# Patient Record
Sex: FEMALE | Race: WHITE | Employment: OTHER | URBAN - METROPOLITAN AREA
[De-identification: names, ages, dates, MRNs, and addresses within clinical notes are randomized per-mention and may not be internally consistent; named-entity substitution may affect disease eponyms.]

---

## 2022-09-21 ENCOUNTER — APPOINTMENT (OUTPATIENT)
Dept: GENERAL RADIOLOGY | Age: 77
DRG: 175 | End: 2022-09-21
Attending: INTERNAL MEDICINE
Payer: MEDICARE

## 2022-09-21 ENCOUNTER — HOSPITAL ENCOUNTER (INPATIENT)
Age: 77
LOS: 6 days | Discharge: HOME | DRG: 175 | End: 2022-09-27
Attending: INTERNAL MEDICINE | Admitting: INTERNAL MEDICINE
Payer: MEDICARE

## 2022-09-21 ENCOUNTER — APPOINTMENT (OUTPATIENT)
Dept: CT IMAGING | Age: 77
DRG: 175 | End: 2022-09-21
Attending: INTERNAL MEDICINE
Payer: MEDICARE

## 2022-09-21 DIAGNOSIS — J90 PLEURAL EFFUSION: ICD-10-CM

## 2022-09-21 DIAGNOSIS — F41.9 ANXIETY: ICD-10-CM

## 2022-09-21 DIAGNOSIS — I26.99 BILATERAL PULMONARY EMBOLISM (HCC): Primary | ICD-10-CM

## 2022-09-21 PROBLEM — J96.00 ACUTE RESPIRATORY FAILURE (HCC): Status: ACTIVE | Noted: 2022-09-21

## 2022-09-21 LAB
A/G RATIO: 1 (ref 1.1–2.2)
A/G RATIO: 1.2 (ref 1.1–2.2)
ALBUMIN SERPL-MCNC: 2.8 G/DL (ref 3.4–5)
ALBUMIN SERPL-MCNC: 3 G/DL (ref 3.4–5)
ALP BLD-CCNC: 140 U/L (ref 40–129)
ALP BLD-CCNC: 144 U/L (ref 40–129)
ALT SERPL-CCNC: 28 U/L (ref 10–40)
ALT SERPL-CCNC: 31 U/L (ref 10–40)
ANION GAP SERPL CALCULATED.3IONS-SCNC: 12 MMOL/L (ref 3–16)
ANION GAP SERPL CALCULATED.3IONS-SCNC: 15 MMOL/L (ref 3–16)
ANISOCYTOSIS: ABNORMAL
ANISOCYTOSIS: ABNORMAL
AST SERPL-CCNC: 62 U/L (ref 15–37)
AST SERPL-CCNC: 66 U/L (ref 15–37)
ATYPICAL LYMPHOCYTE RELATIVE PERCENT: 12 % (ref 0–6)
BASOPHILS ABSOLUTE: 0 K/UL (ref 0–0.2)
BASOPHILS ABSOLUTE: 0 K/UL (ref 0–0.2)
BASOPHILS RELATIVE PERCENT: 0 %
BASOPHILS RELATIVE PERCENT: 0 %
BILIRUB SERPL-MCNC: 0.6 MG/DL (ref 0–1)
BILIRUB SERPL-MCNC: 0.6 MG/DL (ref 0–1)
BUN BLDV-MCNC: 39 MG/DL (ref 7–20)
BUN BLDV-MCNC: 43 MG/DL (ref 7–20)
CALCIUM SERPL-MCNC: 9.4 MG/DL (ref 8.3–10.6)
CALCIUM SERPL-MCNC: 9.6 MG/DL (ref 8.3–10.6)
CHLORIDE BLD-SCNC: 92 MMOL/L (ref 99–110)
CHLORIDE BLD-SCNC: 92 MMOL/L (ref 99–110)
CO2: 27 MMOL/L (ref 21–32)
CO2: 31 MMOL/L (ref 21–32)
CREAT SERPL-MCNC: 0.8 MG/DL (ref 0.6–1.2)
CREAT SERPL-MCNC: 0.9 MG/DL (ref 0.6–1.2)
EKG ATRIAL RATE: 100 BPM
EKG DIAGNOSIS: NORMAL
EKG P AXIS: 38 DEGREES
EKG P-R INTERVAL: 150 MS
EKG Q-T INTERVAL: 366 MS
EKG QRS DURATION: 82 MS
EKG QTC CALCULATION (BAZETT): 472 MS
EKG R AXIS: -2 DEGREES
EKG T AXIS: 28 DEGREES
EKG VENTRICULAR RATE: 100 BPM
EOSINOPHILS ABSOLUTE: 0 K/UL (ref 0–0.6)
EOSINOPHILS ABSOLUTE: 0 K/UL (ref 0–0.6)
EOSINOPHILS RELATIVE PERCENT: 0 %
EOSINOPHILS RELATIVE PERCENT: 0 %
GFR AFRICAN AMERICAN: >60
GFR AFRICAN AMERICAN: >60
GFR NON-AFRICAN AMERICAN: >60
GFR NON-AFRICAN AMERICAN: >60
GLUCOSE BLD-MCNC: 83 MG/DL (ref 70–99)
GLUCOSE BLD-MCNC: 96 MG/DL (ref 70–99)
HCT VFR BLD CALC: 33.6 % (ref 36–48)
HCT VFR BLD CALC: 33.9 % (ref 36–48)
HEMATOLOGY PATH CONSULT: NORMAL
HEMATOLOGY PATH CONSULT: YES
HEMOGLOBIN: 11 G/DL (ref 12–16)
HEMOGLOBIN: 11.2 G/DL (ref 12–16)
LIPASE: 142 U/L (ref 13–60)
LIPASE: 175 U/L (ref 13–60)
LYMPHOCYTES ABSOLUTE: 1.1 K/UL (ref 1–5.1)
LYMPHOCYTES ABSOLUTE: 1.6 K/UL (ref 1–5.1)
LYMPHOCYTES RELATIVE PERCENT: 10 %
LYMPHOCYTES RELATIVE PERCENT: 17 %
MAGNESIUM: 1.7 MG/DL (ref 1.8–2.4)
MAGNESIUM: 2.5 MG/DL (ref 1.8–2.4)
MCH RBC QN AUTO: 28.9 PG (ref 26–34)
MCH RBC QN AUTO: 29.5 PG (ref 26–34)
MCHC RBC AUTO-ENTMCNC: 32.8 G/DL (ref 31–36)
MCHC RBC AUTO-ENTMCNC: 33.1 G/DL (ref 31–36)
MCV RBC AUTO: 88.3 FL (ref 80–100)
MCV RBC AUTO: 89.2 FL (ref 80–100)
MONOCYTES ABSOLUTE: 0.5 K/UL (ref 0–1.3)
MONOCYTES ABSOLUTE: 0.5 K/UL (ref 0–1.3)
MONOCYTES RELATIVE PERCENT: 7 %
MONOCYTES RELATIVE PERCENT: 8 %
NEUTROPHILS ABSOLUTE: 4.9 K/UL (ref 1.7–7.7)
NEUTROPHILS ABSOLUTE: 5 K/UL (ref 1.7–7.7)
NEUTROPHILS RELATIVE PERCENT: 71 %
NEUTROPHILS RELATIVE PERCENT: 75 %
PDW BLD-RTO: 15.6 % (ref 12.4–15.4)
PDW BLD-RTO: 15.9 % (ref 12.4–15.4)
PLATELET # BLD: 233 K/UL (ref 135–450)
PLATELET # BLD: 238 K/UL (ref 135–450)
PLATELET SLIDE REVIEW: ADEQUATE
PLATELET SLIDE REVIEW: ADEQUATE
PMV BLD AUTO: 8.6 FL (ref 5–10.5)
PMV BLD AUTO: 8.8 FL (ref 5–10.5)
POLYCHROMASIA: ABNORMAL
POLYCHROMASIA: ABNORMAL
POTASSIUM REFLEX MAGNESIUM: 3.3 MMOL/L (ref 3.5–5.1)
POTASSIUM REFLEX MAGNESIUM: 3.3 MMOL/L (ref 3.5–5.1)
RBC # BLD: 3.8 M/UL (ref 4–5.2)
RBC # BLD: 3.81 M/UL (ref 4–5.2)
SLIDE REVIEW: ABNORMAL
SLIDE REVIEW: ABNORMAL
SODIUM BLD-SCNC: 134 MMOL/L (ref 136–145)
SODIUM BLD-SCNC: 135 MMOL/L (ref 136–145)
TOTAL PROTEIN: 5.5 G/DL (ref 6.4–8.2)
TOTAL PROTEIN: 5.6 G/DL (ref 6.4–8.2)
TROPONIN: 0.05 NG/ML
WBC # BLD: 6.5 K/UL (ref 4–11)
WBC # BLD: 7.1 K/UL (ref 4–11)

## 2022-09-21 PROCEDURE — 86301 IMMUNOASSAY TUMOR CA 19-9: CPT

## 2022-09-21 PROCEDURE — 2700000000 HC OXYGEN THERAPY PER DAY

## 2022-09-21 PROCEDURE — 93010 ELECTROCARDIOGRAM REPORT: CPT | Performed by: INTERNAL MEDICINE

## 2022-09-21 PROCEDURE — 84155 ASSAY OF PROTEIN SERUM: CPT

## 2022-09-21 PROCEDURE — 86304 IMMUNOASSAY TUMOR CA 125: CPT

## 2022-09-21 PROCEDURE — 84484 ASSAY OF TROPONIN QUANT: CPT

## 2022-09-21 PROCEDURE — 83690 ASSAY OF LIPASE: CPT

## 2022-09-21 PROCEDURE — 71260 CT THORAX DX C+: CPT | Performed by: INTERNAL MEDICINE

## 2022-09-21 PROCEDURE — 6360000002 HC RX W HCPCS: Performed by: INTERNAL MEDICINE

## 2022-09-21 PROCEDURE — 80053 COMPREHEN METABOLIC PANEL: CPT

## 2022-09-21 PROCEDURE — 83735 ASSAY OF MAGNESIUM: CPT

## 2022-09-21 PROCEDURE — 82378 CARCINOEMBRYONIC ANTIGEN: CPT

## 2022-09-21 PROCEDURE — 6360000004 HC RX CONTRAST MEDICATION: Performed by: INTERNAL MEDICINE

## 2022-09-21 PROCEDURE — 6370000000 HC RX 637 (ALT 250 FOR IP): Performed by: INTERNAL MEDICINE

## 2022-09-21 PROCEDURE — 99223 1ST HOSP IP/OBS HIGH 75: CPT | Performed by: INTERNAL MEDICINE

## 2022-09-21 PROCEDURE — 93005 ELECTROCARDIOGRAM TRACING: CPT | Performed by: INTERNAL MEDICINE

## 2022-09-21 PROCEDURE — 2060000000 HC ICU INTERMEDIATE R&B

## 2022-09-21 PROCEDURE — 83883 ASSAY NEPHELOMETRY NOT SPEC: CPT

## 2022-09-21 PROCEDURE — 71045 X-RAY EXAM CHEST 1 VIEW: CPT

## 2022-09-21 PROCEDURE — 85025 COMPLETE CBC W/AUTO DIFF WBC: CPT

## 2022-09-21 PROCEDURE — 2580000003 HC RX 258: Performed by: INTERNAL MEDICINE

## 2022-09-21 PROCEDURE — 86300 IMMUNOASSAY TUMOR CA 15-3: CPT

## 2022-09-21 PROCEDURE — 84165 PROTEIN E-PHORESIS SERUM: CPT

## 2022-09-21 PROCEDURE — 94761 N-INVAS EAR/PLS OXIMETRY MLT: CPT

## 2022-09-21 PROCEDURE — 82784 ASSAY IGA/IGD/IGG/IGM EACH: CPT

## 2022-09-21 PROCEDURE — 36415 COLL VENOUS BLD VENIPUNCTURE: CPT

## 2022-09-21 PROCEDURE — 6370000000 HC RX 637 (ALT 250 FOR IP)

## 2022-09-21 RX ORDER — SODIUM CHLORIDE 0.9 % (FLUSH) 0.9 %
5-40 SYRINGE (ML) INJECTION EVERY 12 HOURS SCHEDULED
Status: DISCONTINUED | OUTPATIENT
Start: 2022-09-21 | End: 2022-09-27 | Stop reason: HOSPADM

## 2022-09-21 RX ORDER — ATORVASTATIN CALCIUM 10 MG/1
10 TABLET, FILM COATED ORAL NIGHTLY
Status: DISCONTINUED | OUTPATIENT
Start: 2022-09-21 | End: 2022-09-27 | Stop reason: HOSPADM

## 2022-09-21 RX ORDER — FUROSEMIDE 40 MG/1
40 TABLET ORAL DAILY
Status: ON HOLD | COMMUNITY
End: 2022-09-27 | Stop reason: HOSPADM

## 2022-09-21 RX ORDER — NYSTATIN 100000 [USP'U]/G
100000 POWDER TOPICAL 3 TIMES DAILY
Status: ON HOLD | COMMUNITY
End: 2022-09-21

## 2022-09-21 RX ORDER — FENOFIBRATE 160 MG/1
160 TABLET ORAL DAILY
Status: DISCONTINUED | OUTPATIENT
Start: 2022-09-21 | End: 2022-09-27 | Stop reason: HOSPADM

## 2022-09-21 RX ORDER — FERROUS SULFATE 325(65) MG
325 TABLET ORAL
COMMUNITY

## 2022-09-21 RX ORDER — DIMETHICONE, OXYBENZONE, AND PADIMATE O 2; 2.5; 6.6 G/100G; G/100G; G/100G
STICK TOPICAL
Status: COMPLETED
Start: 2022-09-21 | End: 2022-09-21

## 2022-09-21 RX ORDER — ERGOCALCIFEROL 1.25 MG/1
50000 CAPSULE ORAL WEEKLY
COMMUNITY

## 2022-09-21 RX ORDER — CETIRIZINE HYDROCHLORIDE 10 MG/1
5 TABLET ORAL DAILY
Status: DISCONTINUED | OUTPATIENT
Start: 2022-09-21 | End: 2022-09-27 | Stop reason: HOSPADM

## 2022-09-21 RX ORDER — POLYETHYLENE GLYCOL 3350 17 G/17G
17 POWDER, FOR SOLUTION ORAL DAILY PRN
Status: DISCONTINUED | OUTPATIENT
Start: 2022-09-21 | End: 2022-09-27 | Stop reason: HOSPADM

## 2022-09-21 RX ORDER — ATORVASTATIN CALCIUM 10 MG/1
10 TABLET, FILM COATED ORAL NIGHTLY
COMMUNITY

## 2022-09-21 RX ORDER — SODIUM CHLORIDE 0.9 % (FLUSH) 0.9 %
5-40 SYRINGE (ML) INJECTION PRN
Status: DISCONTINUED | OUTPATIENT
Start: 2022-09-21 | End: 2022-09-27 | Stop reason: HOSPADM

## 2022-09-21 RX ORDER — MYCOPHENOLATE MOFETIL 500 MG/1
1000 TABLET ORAL NIGHTLY
COMMUNITY

## 2022-09-21 RX ORDER — FERROUS SULFATE 325(65) MG
325 TABLET ORAL
Status: DISCONTINUED | OUTPATIENT
Start: 2022-09-21 | End: 2022-09-27 | Stop reason: HOSPADM

## 2022-09-21 RX ORDER — ACETAMINOPHEN 325 MG/1
650 TABLET ORAL EVERY 6 HOURS PRN
COMMUNITY

## 2022-09-21 RX ORDER — MYCOPHENOLATE MOFETIL 500 MG/1
500 TABLET ORAL EVERY MORNING
COMMUNITY

## 2022-09-21 RX ORDER — LANOLIN ALCOHOL/MO/W.PET/CERES
3 CREAM (GRAM) TOPICAL NIGHTLY PRN
COMMUNITY

## 2022-09-21 RX ORDER — ALUMINA, MAGNESIA, AND SIMETHICONE 2400; 2400; 240 MG/30ML; MG/30ML; MG/30ML
30 SUSPENSION ORAL EVERY 4 HOURS PRN
COMMUNITY

## 2022-09-21 RX ORDER — ACETAMINOPHEN 650 MG/1
650 SUPPOSITORY RECTAL EVERY 6 HOURS PRN
Status: DISCONTINUED | OUTPATIENT
Start: 2022-09-21 | End: 2022-09-27 | Stop reason: HOSPADM

## 2022-09-21 RX ORDER — POTASSIUM CHLORIDE 750 MG/1
40 TABLET, EXTENDED RELEASE ORAL PRN
Status: DISCONTINUED | OUTPATIENT
Start: 2022-09-21 | End: 2022-09-27 | Stop reason: HOSPADM

## 2022-09-21 RX ORDER — MAGNESIUM SULFATE IN WATER 40 MG/ML
2000 INJECTION, SOLUTION INTRAVENOUS PRN
Status: DISCONTINUED | OUTPATIENT
Start: 2022-09-21 | End: 2022-09-27 | Stop reason: HOSPADM

## 2022-09-21 RX ORDER — SODIUM CHLORIDE 9 MG/ML
INJECTION, SOLUTION INTRAVENOUS PRN
Status: DISCONTINUED | OUTPATIENT
Start: 2022-09-21 | End: 2022-09-27 | Stop reason: HOSPADM

## 2022-09-21 RX ORDER — TRAMADOL HYDROCHLORIDE 50 MG/1
50 TABLET ORAL EVERY 8 HOURS PRN
Status: ON HOLD | COMMUNITY
End: 2022-09-27 | Stop reason: HOSPADM

## 2022-09-21 RX ORDER — TRAMADOL HYDROCHLORIDE 50 MG/1
50 TABLET ORAL EVERY 6 HOURS PRN
Status: DISCONTINUED | OUTPATIENT
Start: 2022-09-21 | End: 2022-09-27

## 2022-09-21 RX ORDER — MYCOPHENOLATE MOFETIL 250 MG/1
1000 CAPSULE ORAL NIGHTLY
Status: DISCONTINUED | OUTPATIENT
Start: 2022-09-21 | End: 2022-09-27 | Stop reason: HOSPADM

## 2022-09-21 RX ORDER — DOCUSATE SODIUM 100 MG/1
100 CAPSULE, LIQUID FILLED ORAL 2 TIMES DAILY PRN
Status: DISCONTINUED | OUTPATIENT
Start: 2022-09-21 | End: 2022-09-27 | Stop reason: HOSPADM

## 2022-09-21 RX ORDER — POTASSIUM CHLORIDE 7.45 MG/ML
10 INJECTION INTRAVENOUS PRN
Status: DISCONTINUED | OUTPATIENT
Start: 2022-09-21 | End: 2022-09-27 | Stop reason: HOSPADM

## 2022-09-21 RX ORDER — CARVEDILOL 6.25 MG/1
6.25 TABLET ORAL 2 TIMES DAILY WITH MEALS
COMMUNITY

## 2022-09-21 RX ORDER — LEVOCETIRIZINE DIHYDROCHLORIDE 5 MG/1
5 TABLET, FILM COATED ORAL EVERY MORNING
COMMUNITY

## 2022-09-21 RX ORDER — POTASSIUM CHLORIDE 750 MG/1
10 CAPSULE, EXTENDED RELEASE ORAL DAILY
COMMUNITY

## 2022-09-21 RX ORDER — ONDANSETRON 4 MG/1
4 TABLET, ORALLY DISINTEGRATING ORAL EVERY 8 HOURS PRN
Status: DISCONTINUED | OUTPATIENT
Start: 2022-09-21 | End: 2022-09-27 | Stop reason: HOSPADM

## 2022-09-21 RX ORDER — MAGNESIUM SULFATE IN WATER 40 MG/ML
2000 INJECTION, SOLUTION INTRAVENOUS ONCE
Status: COMPLETED | OUTPATIENT
Start: 2022-09-21 | End: 2022-09-21

## 2022-09-21 RX ORDER — FUROSEMIDE 40 MG/1
40 TABLET ORAL DAILY
Status: DISCONTINUED | OUTPATIENT
Start: 2022-09-21 | End: 2022-09-22

## 2022-09-21 RX ORDER — IPRATROPIUM BROMIDE AND ALBUTEROL SULFATE 2.5; .5 MG/3ML; MG/3ML
1 SOLUTION RESPIRATORY (INHALATION) EVERY 4 HOURS PRN
Status: DISCONTINUED | OUTPATIENT
Start: 2022-09-21 | End: 2022-09-27 | Stop reason: HOSPADM

## 2022-09-21 RX ORDER — ENOXAPARIN SODIUM 100 MG/ML
1 INJECTION SUBCUTANEOUS DAILY
Status: DISCONTINUED | OUTPATIENT
Start: 2022-09-21 | End: 2022-09-21

## 2022-09-21 RX ORDER — FENOFIBRATE 160 MG/1
160 TABLET ORAL NIGHTLY
COMMUNITY

## 2022-09-21 RX ORDER — ALPRAZOLAM 0.25 MG/1
0.25 TABLET ORAL 4 TIMES DAILY PRN
Status: DISCONTINUED | OUTPATIENT
Start: 2022-09-21 | End: 2022-09-27 | Stop reason: HOSPADM

## 2022-09-21 RX ORDER — ONDANSETRON 2 MG/ML
4 INJECTION INTRAMUSCULAR; INTRAVENOUS EVERY 6 HOURS PRN
Status: DISCONTINUED | OUTPATIENT
Start: 2022-09-21 | End: 2022-09-27 | Stop reason: HOSPADM

## 2022-09-21 RX ORDER — OMEPRAZOLE 20 MG/1
20 CAPSULE, DELAYED RELEASE ORAL DAILY
COMMUNITY

## 2022-09-21 RX ORDER — CARVEDILOL 6.25 MG/1
6.25 TABLET ORAL 2 TIMES DAILY WITH MEALS
Status: DISCONTINUED | OUTPATIENT
Start: 2022-09-21 | End: 2022-09-27 | Stop reason: HOSPADM

## 2022-09-21 RX ORDER — METOLAZONE 2.5 MG/1
2.5 TABLET ORAL DAILY
Status: ON HOLD | COMMUNITY
End: 2022-09-27 | Stop reason: HOSPADM

## 2022-09-21 RX ORDER — MYCOPHENOLATE MOFETIL 250 MG/1
500 CAPSULE ORAL EVERY MORNING
Status: DISCONTINUED | OUTPATIENT
Start: 2022-09-21 | End: 2022-09-27 | Stop reason: HOSPADM

## 2022-09-21 RX ORDER — ENOXAPARIN SODIUM 100 MG/ML
1 INJECTION SUBCUTANEOUS 2 TIMES DAILY
Status: DISCONTINUED | OUTPATIENT
Start: 2022-09-21 | End: 2022-09-27

## 2022-09-21 RX ORDER — ACETAMINOPHEN 325 MG/1
650 TABLET ORAL EVERY 6 HOURS PRN
Status: DISCONTINUED | OUTPATIENT
Start: 2022-09-21 | End: 2022-09-27 | Stop reason: HOSPADM

## 2022-09-21 RX ORDER — LANOLIN ALCOHOL/MO/W.PET/CERES
3 CREAM (GRAM) TOPICAL NIGHTLY PRN
Status: DISCONTINUED | OUTPATIENT
Start: 2022-09-21 | End: 2022-09-27 | Stop reason: HOSPADM

## 2022-09-21 RX ORDER — DOCUSATE SODIUM 100 MG/1
100 CAPSULE, LIQUID FILLED ORAL 2 TIMES DAILY PRN
COMMUNITY

## 2022-09-21 RX ADMIN — ONDANSETRON HYDROCHLORIDE 4 MG: 2 INJECTION, SOLUTION INTRAMUSCULAR; INTRAVENOUS at 12:40

## 2022-09-21 RX ADMIN — SODIUM CHLORIDE, PRESERVATIVE FREE 10 ML: 5 INJECTION INTRAVENOUS at 22:24

## 2022-09-21 RX ADMIN — ATORVASTATIN CALCIUM 10 MG: 10 TABLET, FILM COATED ORAL at 20:55

## 2022-09-21 RX ADMIN — ACETAMINOPHEN 650 MG: 325 TABLET ORAL at 15:14

## 2022-09-21 RX ADMIN — POTASSIUM BICARBONATE 10 MEQ: 391 TABLET, EFFERVESCENT ORAL at 14:04

## 2022-09-21 RX ADMIN — POTASSIUM CHLORIDE 40 MEQ: 750 TABLET, EXTENDED RELEASE ORAL at 03:58

## 2022-09-21 RX ADMIN — ENOXAPARIN SODIUM 70 MG: 100 INJECTION SUBCUTANEOUS at 10:25

## 2022-09-21 RX ADMIN — CARVEDILOL 6.25 MG: 6.25 TABLET, FILM COATED ORAL at 10:02

## 2022-09-21 RX ADMIN — IOPAMIDOL 85 ML: 755 INJECTION, SOLUTION INTRAVENOUS at 09:22

## 2022-09-21 RX ADMIN — POTASSIUM CHLORIDE 40 MEQ: 750 TABLET, EXTENDED RELEASE ORAL at 12:31

## 2022-09-21 RX ADMIN — SODIUM CHLORIDE, PRESERVATIVE FREE 10 ML: 5 INJECTION INTRAVENOUS at 10:09

## 2022-09-21 RX ADMIN — MAGNESIUM SULFATE HEPTAHYDRATE 2000 MG: 40 INJECTION, SOLUTION INTRAVENOUS at 03:57

## 2022-09-21 RX ADMIN — MYCOPHENOLATE MOFETIL 1000 MG: 250 CAPSULE ORAL at 20:55

## 2022-09-21 RX ADMIN — ACETAMINOPHEN 650 MG: 325 TABLET ORAL at 09:03

## 2022-09-21 RX ADMIN — FENOFIBRATE 160 MG: 160 TABLET ORAL at 10:03

## 2022-09-21 RX ADMIN — FUROSEMIDE 40 MG: 40 TABLET ORAL at 10:02

## 2022-09-21 RX ADMIN — TRAMADOL HYDROCHLORIDE 50 MG: 50 TABLET, COATED ORAL at 04:05

## 2022-09-21 RX ADMIN — ENOXAPARIN SODIUM 70 MG: 100 INJECTION SUBCUTANEOUS at 20:55

## 2022-09-21 RX ADMIN — ALPRAZOLAM 0.25 MG: 0.25 TABLET ORAL at 09:03

## 2022-09-21 RX ADMIN — MYCOPHENOLATE MOFETIL 500 MG: 250 CAPSULE ORAL at 10:03

## 2022-09-21 RX ADMIN — Medication: at 15:06

## 2022-09-21 RX ADMIN — ALPRAZOLAM 0.25 MG: 0.25 TABLET ORAL at 15:14

## 2022-09-21 RX ADMIN — FERROUS SULFATE TAB 325 MG (65 MG ELEMENTAL FE) 325 MG: 325 (65 FE) TAB at 10:02

## 2022-09-21 RX ADMIN — TRAMADOL HYDROCHLORIDE 50 MG: 50 TABLET, COATED ORAL at 09:03

## 2022-09-21 ASSESSMENT — PAIN DESCRIPTION - LOCATION
LOCATION: BACK;RIB CAGE
LOCATION: CHEST
LOCATION: BACK;RIB CAGE
LOCATION: ABDOMEN

## 2022-09-21 ASSESSMENT — PAIN SCALES - GENERAL
PAINLEVEL_OUTOF10: 3
PAINLEVEL_OUTOF10: 8
PAINLEVEL_OUTOF10: 0
PAINLEVEL_OUTOF10: 3
PAINLEVEL_OUTOF10: 8
PAINLEVEL_OUTOF10: 8

## 2022-09-21 ASSESSMENT — PAIN DESCRIPTION - DESCRIPTORS
DESCRIPTORS: THROBBING
DESCRIPTORS: ACHING
DESCRIPTORS: THROBBING

## 2022-09-21 ASSESSMENT — PAIN - FUNCTIONAL ASSESSMENT
PAIN_FUNCTIONAL_ASSESSMENT: PREVENTS OR INTERFERES WITH MANY ACTIVE NOT PASSIVE ACTIVITIES
PAIN_FUNCTIONAL_ASSESSMENT: ACTIVITIES ARE NOT PREVENTED

## 2022-09-21 ASSESSMENT — PAIN DESCRIPTION - FREQUENCY
FREQUENCY: INTERMITTENT
FREQUENCY: INTERMITTENT

## 2022-09-21 ASSESSMENT — PAIN DESCRIPTION - ORIENTATION: ORIENTATION: RIGHT

## 2022-09-21 ASSESSMENT — PAIN DESCRIPTION - PAIN TYPE
TYPE: ACUTE PAIN;CHRONIC PAIN
TYPE: ACUTE PAIN;CHRONIC PAIN

## 2022-09-21 NOTE — PROGRESS NOTES
Care rounds completed with Dr. Haylie Merchant and multidisciplinary team. Reviewed labs, meds, VS, assessment, & plan of care for today. See dictated note and new orders for details.      DC VQ scan  CTA of the chest  Try to wean off oxygen  Downgrade to PCU

## 2022-09-21 NOTE — PROGRESS NOTES
4 Eyes Skin Assessment     The patient is being assess for   Shift Handoff    I agree that 2 RN's have performed a thorough Head to Toe Skin Assessment on the patient. ALL assessment sites listed below have been assessed. Areas assessed for pressure by both nurses:   [x]   Head, Face, and Ears   [x]   Shoulders, Back, and Chest, Abdomen  [x]   Arms, Elbows, and Hands   [x]   Coccyx, Sacrum, and Ischium  [x]   Legs, Feet, and Heels        Skin Assessed Under all Medical Devices by both nurses:  O2 device tubing              All Mepilex Borders were peeled back and area peeked at by both nurses: yes  Please list where Mepilex Borders are located: heels             **SHARE this note so that the co-signing nurse is able to place an eSignature**    Co-signer eSignature: Electronically signed by Blanca Prakash RN on 9/21/22 at 6:46 PM EDT    Does the Patient have Skin Breakdown related to pressure?   Yes LDA WOUND CARE was Initiated documentation include the Melonie-wound, Wound Assessment, Measurements, Dressing Treatment, Drainage, and Color\",              Robin Prevention initiated:  Yes   Wound Care Orders initiated:  Yes      [id number] nurse consulted for Pressure Injury (Stage 3,4, Unstageable, DTI, NWPT, Complex wounds)and New or Established Ostomies:  NA      Primary Nurse eSignature: Electronically signed by Tawana Holter, RN on 9/21/22 at 5:49 PM EDT

## 2022-09-21 NOTE — PROGRESS NOTES
Dr. Jaye Canales called, stated pt with bilateral PEs. Okay to use current anticoagulation of lovenox BID. Call radiology partners and make them aware Dr. Jaye Canales looked at [id number].

## 2022-09-21 NOTE — CARE COORDINATION
Case Management Assessment  Initial Evaluation      Patient Name: Annalisa Pedraza  YOB: 1945  Diagnosis: Acute respiratory failure ([facility] [address] [J96.00]  Acute on chronic respiratory failure ([facility] [address] [J96.20]  Date / Time: 9/21/2022 12:11 AM    Admission status/Date:9/21/2022  Chart Reviewed: Yes      Patient Chantel Dorado: Yes at bedside  Family Interviewed:  Yes Laureen Chaney (daughter) and Abhi Hill (son-in-law) Ryan Valera  with PT's permission    Hospitalization in the last 30 days:  No      Health Care Decision Maker :   Primary Decision Maker: Wesley Hou - Child - [phone]    (CM - must 1st enter selection under Navigator - emergency contact- Reddy 8 Relationship and pick relationship)   Who do you trust or have selected to make healthcare decisions for you      Met with: PT, daughter Livier Bryan, and son in law Abhi Hill at bedside with Pt's permission    Current PCP: Josef Bailey (unsure of spelling)      Financial  Medicare  Precert required for SNF : N         3 night stay required - Y    ADLS  Support Systems/Care Needs: Children  Transportation: family    Meal Preparation: Pt does not cook. Eats frozen meals/pre-packaged food at home. Housing  Living Arrangements: PT currently at [facility], previously living at home with daughter, Efrem Santillan  Steps: 0  Intent for return to present living arrangements: Yes  Identified Issues: Pt requires assistance with most tasks from Efrem Santillan, daughter.      Home Care Information  Active with Home Health Care : No Agency:(Services)     Passport/Waiver : No  :                      Phone Number:    Passport/Waiver Services: n/a          Durable Medical Equiptment   DME Provider: n/a  Equipment: DME at home  Walker__x_Cane___RTS___ BSC_x__Shower Chair__x_Hospital Bed___W/C____Other________  02 at ____Liter(s)---wears(frequency)_______ [facility] ___ CPAP___ BiPap___   N/A____    Home O2 Use :  No    If No for home O2---if presently on O2 during hospitalization:  Yes  if yes CM to follow for potential DC O2 need    Community Service Affiliation  Dialysis:  No      Other Community Services: n/a    DISCHARGE PLAN: Explained Case Management role/services. CM met with pt at bedside with Donald Renner, daughter and Eder Miller, son in law with pt's permission. Pt to return to [facility] upon discharge, transportation first available. Louis Ndiaye at [facility] faxed over the pt's living will, is searching for the POA documents. Wichita Senthil noted that PT was transitioning from Med A to private pay due to Pt's lack of participation in PT/OT during her time at the facility. Family was hoping participation might increase after extending stay. No safety concerns expressed during conversation with CM. Nurse made aware if has safety concerns, can call APS.       Ericka Emmanuel, MSW Student

## 2022-09-21 NOTE — PROGRESS NOTES
RT Inhaler-Nebulizer Bronchodilator Protocol Note    There is a bronchodilator order in the chart from a provider indicating to follow the RT Bronchodilator Protocol and there is an Initiate RT Inhaler-Nebulizer Bronchodilator Protocol order as well (see protocol at bottom of note). CXR Findings:  No results found. The findings from the last RT Protocol Assessment were as follows:   History Pulmonary Disease: Smoker 15 pack years or more  Respiratory Pattern: Regular pattern and RR 12-20 bpm  Breath Sounds: Slightly diminished and/or crackles  Cough: Strong, spontaneous, non-productive  Indication for Bronchodilator Therapy: Decreased or absent breath sounds  Bronchodilator Assessment Score: 3    Aerosolized bronchodilator medication orders have been revised according to the RT Inhaler-Nebulizer Bronchodilator Protocol below. Respiratory Therapist to perform RT Therapy Protocol Assessment initially then follow the protocol. Repeat RT Therapy Protocol Assessment PRN for score 0-3 or on second treatment, BID, and PRN for scores above 3. No Indications - adjust the frequency to every 6 hours PRN wheezing or bronchospasm, if no treatments needed after 48 hours then discontinue using Per Protocol order mode. If indication present, adjust the RT bronchodilator orders based on the Bronchodilator Assessment Score as indicated below. Use Inhaler orders unless patient has one or more of the following: on home nebulizer, not able to hold breath for 10 seconds, is not alert and oriented, cannot activate and use MDI correctly, or respiratory rate 25 breaths per minute or more, then use the equivalent nebulizer order(s) with same Frequency and PRN reasons based on the score. If a patient is on this medication at home then do not decrease Frequency below that used at home.     0-3 - enter or revise RT bronchodilator order(s) to equivalent RT Bronchodilator order with Frequency of every 4 hours PRN for wheezing or increased work of breathing using Per Protocol order mode. 4-6 - enter or revise RT Bronchodilator order(s) to two equivalent RT bronchodilator orders with one order with BID Frequency and one order with Frequency of every 4 hours PRN wheezing or increased work of breathing using Per Protocol order mode. 7-10 - enter or revise RT Bronchodilator order(s) to two equivalent RT bronchodilator orders with one order with TID Frequency and one order with Frequency of every 4 hours PRN wheezing or increased work of breathing using Per Protocol order mode. 11-13 - enter or revise RT Bronchodilator order(s) to one equivalent RT bronchodilator order with QID Frequency and an Albuterol order with Frequency of every 4 hours PRN wheezing or increased work of breathing using Per Protocol order mode. Greater than 13 - enter or revise RT Bronchodilator order(s) to one equivalent RT bronchodilator order with every 4 hours Frequency and an Albuterol order with Frequency of every 2 hours PRN wheezing or increased work of breathing using Per Protocol order mode.          Electronically signed by Eleanor Broussard RCP on 9/21/2022 at 1:54 AM

## 2022-09-21 NOTE — PLAN OF CARE
Problem: Discharge Planning  Goal: Discharge to home or other facility with appropriate resources  Outcome: Progressing  Flowsheets (Taken 9/21/2022 0021)  Discharge to home or other facility with appropriate resources: Identify barriers to discharge with patient and caregiver     Problem: Pain  Goal: Verbalizes/displays adequate comfort level or baseline comfort level  Outcome: Progressing     Problem: Neurosensory - Adult  Goal: Achieves stable or improved neurological status  Outcome: Progressing     Problem: Respiratory - Adult  Goal: Achieves optimal ventilation and oxygenation  Outcome: Progressing     Problem: Cardiovascular - Adult  Goal: Maintains optimal cardiac output and hemodynamic stability  Outcome: Progressing     Problem: Skin/Tissue Integrity - Adult  Goal: Skin integrity remains intact  Outcome: Progressing  Goal: Incisions, wounds, or drain sites healing without S/S of infection  Outcome: Progressing     Problem: Musculoskeletal - Adult  Goal: Return mobility to safest level of function  Outcome: Progressing     Problem: Gastrointestinal - Adult  Goal: Maintains or returns to baseline bowel function  Outcome: Progressing     Problem: Genitourinary - Adult  Goal: Absence of urinary retention  Outcome: Progressing     Problem: Infection - Adult  Goal: Absence of infection at discharge  Outcome: Progressing     Problem: Metabolic/Fluid and Electrolytes - Adult  Goal: Electrolytes maintained within normal limits  Outcome: Progressing     Reviewed with pt plan of care for shift and medications, all questions answered, pt voices no concerns.

## 2022-09-21 NOTE — PROGRESS NOTES
Patient given 30 mEq of potassium chloride (KLOR-CON M) extended release tablet, became nauseated, gave 10 mEq of potassium bicarb-citric acid (EFFER-K) effervescent tablet. Given 40 mEq per MAR.

## 2022-09-21 NOTE — CONSULTS
Reason for referral and CC: Acute PE    HISTORY OF PRESENT ILLNESS: 66 yo female with a h/o CHF presented to outside hospital with a syncopal episode and shortness of breath. She was found to be hypoxic and with elevated D-dimer and transferred to our ICU. I obtained a CTPA which showed bilateral pulmonary emboli and axial osteolytic lesions. She is currently on 4 L of oxygen and complains of shortness of breath and back pain. Also c/o somewhat chronic abd pain. States lipase was elevated at [facility]    Past Medical History:   Diagnosis Date    Cancer ([facility] [address]     CHF (congestive heart failure) ([facility] [address]     Hx of blood clots     Hyperlipidemia      Past Surgical History:   Procedure Laterality Date    BREAST SURGERY       Family History  family history is not on file. Social History:  reports that she quit smoking about 32 years ago. Her smoking use included cigarettes. She has never used smokeless tobacco.   reports no history of alcohol use. ALLERGIES:  Patient is allergic to aspirin, clavulanic acid, codeine, and augmentin [amoxicillin-pot clavulanate].   Continuous Infusions:   sodium chloride       Scheduled Meds:   sodium chloride flush  5-40 mL IntraVENous 2 times per day    enoxaparin  1 mg/kg SubCUTAneous BID    atorvastatin  10 mg Oral Nightly    carvedilol  6.25 mg Oral BID WC    fenofibrate  160 mg Oral Daily    ferrous sulfate  325 mg Oral Daily with breakfast    furosemide  40 mg Oral Daily    cetirizine  5 mg Oral Daily    mycophenolate  1,000 mg Oral Nightly    mycophenolate  500 mg Oral QAM     PRN Meds:  sodium chloride flush, sodium chloride, ondansetron **OR** ondansetron, polyethylene glycol, acetaminophen **OR** acetaminophen, potassium chloride **OR** potassium alternative oral replacement **OR** potassium chloride, magnesium sulfate, ipratropium-albuterol, ALPRAZolam, traMADol, docusate sodium, melatonin    REVIEW OF SYSTEMS:  Constitutional: Negative for fever  HENT: Negative for sore throat  Eyes: Negative for redness   Respiratory: Negative for + dyspnea  Cardiovascular: Negative for chest pain  Gastrointestinal: Negative for vomiting, diarrhea , + abd pain  Genitourinary: Negative for hematuria   Musculoskeletal: Negative for arthralgias   Skin: Negative for rash  Neurological: Negative for syncope  Hematological: Negative for adenopathy  Psychiatric/Behavorial: Negative for anxiety    PHYSICAL EXAM: /70   Pulse (!) 101   Temp 98.8 °F (37.1 °C) (Axillary)   Resp 23   Ht 5' (1.524 m)   Wt 144 lb 10 oz (65.6 kg)   LMP  (LMP Unknown) Comment: post menopausal  SpO2 95%   BMI 28.24 kg/m²  on 4L  Constitutional:  No acute distress. Eyes: PERRL. Conjunctivae anicteric. ENT: Normal nose. Normal tongue. Neck:  Trachea is midline. No thyroid tenderness. Respiratory: No accessory muscle usage. no decreased breath sounds. No wheezes. No rales. No Rhonchi. Cardiovascular: Normal S1S2. No digit clubbing. No digit cyanosis. No LE edema. Capillary refill is normal.   Gastrointestinal: No mass palpated. No tenderness palpated. No umbilical hernia. Lymphatic: No cervical or supraclavicular adenopathy. Skin: No rash on the exposed extremities. No Nodules or induration on exposed extremities. Psychiatric: No anxiety or Agitation. Alert and Oriented to person, place and time. CBC:   Recent Labs     09/21/22  0114 09/21/22  0610   WBC 7.1 6.5   HGB 11.2* 11.0*   HCT 33.9* 33.6*   MCV 89.2 88.3    233     BMP:   Recent Labs     09/21/22  0114 09/21/22  0610   * 134*   K 3.3* 3.3*   CL 92* 92*   CO2 31 27   BUN 43* 39*   CREATININE 0.9 0.8        Recent Labs     09/21/22  0114 09/21/22  0610   AST 66* 62*   ALT 31 28   BILITOT 0.6 0.6   ALKPHOS 144* 140*     No results for input(s): PROTIME, INR in the last 72 hours.   No results for input(s): NITRITE, COLORU, PHUR, LABCAST, WBCUA, RBCUA, MUCUS, TRICHOMONAS, YEAST, BACTERIA, CLARITYU, SPECGRAV, LEUKOCYTESUR, UROBILINOGEN, BILIRUBINUR, BLOODU, GLUCOSEU, AMORPHOUS in the last 72 hours. Invalid input(s): KETONESU  No results for input(s): PHART, WTL2IOQ, PO2ART in the last 72 hours. CTPA  Pulmonary Arteries: Pulmonary arteries are adequately opacified for   evaluation. There are multiple bilateral occlusive and partially occlusive   intraluminal filling defects within the distal main and bilateral segmental   pulmonary arterial vessels. The main pulmonary artery is normal in caliber. There is evidence of right ventricular strain with retrograde flow of   contrast into the intrahepatic venous system. Mediastinum: No evidence of mediastinal lymphadenopathy. The heart and   pericardium demonstrate no acute abnormality. There is no acute abnormality   of the thoracic aorta. There is a moderate-sized hiatal hernia. Lungs/pleura: The lungs are without acute process. No focal consolidation or   pulmonary edema. No evidence of pleural effusion or pneumothorax. Upper Abdomen: Limited images of the upper abdomen are unremarkable. Soft Tissues/Bones: There are multiple lytic lesions scattered throughout the   sternum and bilateral ribs with several remote healed pathologic fractures. Chronic severe multilevel wedge compression fractures are present within the   thoracic spine status post kyphoplasty. Impression   1. Multiple acute bilateral pulmonary emboli with evidence of right   ventricular strain. 2. Disseminated osteolytic disease. The differential includes metastatic   disease and myeloma.        ASSESSMENT:  Acute bilateral PEs possible secondary to malignancy  Acute hypoxic respiratory failure   Osteolytic lesions  Back pain  Chronic anxiety on xanax  Abd pain  Myasthenia gravis on cellcept    PLAN:  Supplemental oxygen to maintain SaO2 >92%; wean as tolerated    TTE  Therapeutic lovenox  Check lipase  Consult Oncology  [id number] for PCU    Thank you Nicole Morales, * for this consult

## 2022-09-21 NOTE — PROGRESS NOTES
Dr. Genesis Worthy and Dr. Marilyn Medina made aware    Latest Reference Range & Units 9/21/22 06:10   Lipase 13.0 - 60.0 U/L 175.0 (H)   (H): Data is abnormally high. No new orders at this time.

## 2022-09-21 NOTE — PROGRESS NOTES
Patient complaining of anxiety and 7/10 pain in upper abd and chest. Given Tramadol, acetaminophen, and xanax per [facility].

## 2022-09-21 NOTE — PROGRESS NOTES
Shift assessment, completed, see flow sheet. Pt is alert and oriented x 4. Following commands. Bilateral lung sounds diminished w/ dyspnea on exertion. Call light within reach. Bed in lowest position. Bed alarm on.

## 2022-09-21 NOTE — ACP (ADVANCE CARE PLANNING)
Advance Care Planning     General Advance Care Planning (ACP) Nancy Cuba at [facility] sent living will documentation, which were placed on chart and he is looking for POA paperwork. RN is asking for POA paperwork as well.        KOFI Maya student

## 2022-09-21 NOTE — CONSULTS
HEMATOLOGY/ONCOLOGY CONSULTATION:     2022 2:21 PM    REASON FOR CONSULT: Bone lesions    PROVIDERS:  No primary care provider on file. CHIEF COMPLAINT:     No chief complaint on file. HISTORY OF PRESENT ILLNESS:     HPI:  71 WF with pmh CHF and HLD. She has a remote history of breast cancer 30-40 years ago. She resides at a nursing home. She was transferred from [facility] where she presented with a syncopal episode and SOB. She was recently discharged from [facility] with CHF exacerbation. She was hypoxic and admitted to ICU. She had a CTPA showing PE. She is now on Lovenox. CT scan also showed lytic bone lesions suspicious for myeloma or metastasis. PAST MEDICAL HISTORY:     Past Medical History:   Diagnosis Date    Cancer ([address]     CHF (congestive heart failure) (H[facility])     Hx of blood clots     Hyperlipidemia        PAST SURGICAL HISTORY:        Past Surgical History:   Procedure Laterality Date    BREAST SURGERY         SOCIAL HISTORY:     Social History     Socioeconomic History    Marital status: Single     Spouse name: Not on file    Number of children: Not on file    Years of education: Not on file    Highest education level: Not on file   Occupational History    Not on file   Tobacco Use    Smoking status: Former     Types: Cigarettes     Quit date: 1990     Years since quittin.7    Smokeless tobacco: Never   Vaping Use    Vaping Use: Never used   Substance and Sexual Activity    Alcohol use: Never    Drug use: Never    Sexual activity: Never   Other Topics Concern    Not on file   Social History Narrative    Not on file     Social Determinants of Health     Financial Resource Strain: Not on file   Food Insecurity: Not on file   Transportation Needs: Not on file   Physical Activity: Not on file   Stress: Not on file   Social Connections: Not on file   Intimate Partner Violence: Not on file   Housing Stability: Not on file       FAMILY HISTORY:     History reviewed.  No pertinent family history. ALLERGIES:     Allergies as of 09/20/2022    (Not on File)       MEDICATIONS:     No current facility-administered medications on file prior to encounter. Current Outpatient Medications on File Prior to Encounter   Medication Sig Dispense Refill    atorvastatin (LIPITOR) 10 MG tablet Take 10 mg by mouth nightly      carvedilol (COREG) 6.25 MG tablet Take 6.25 mg by mouth 2 times daily (with meals)      docusate sodium (COLACE) 100 MG capsule Take 100 mg by mouth 2 times daily as needed for Constipation      acetaminophen (TYLENOL) 325 MG tablet Take 650 mg by mouth every 6 hours as needed for Pain or Fever      vitamin D (ERGOCALCIFEROL) 1.25 MG (51364 UT) CAPS capsule Take 50,000 Units by mouth once a week Takes on Monday      fenofibrate (TRIGLIDE) 160 MG tablet Take 160 mg by mouth nightly      ferrous sulfate (IRON 325) 325 (65 Fe) MG tablet Take 325 mg by mouth daily (with breakfast)      levocetirizine (XYZAL) 5 MG tablet Take 5 mg by mouth every morning      aluminum & magnesium hydroxide-simethicone (MYLANTA) 400-400-40 MG/5ML SUSP Take 30 mLs by mouth every 4 hours as needed (dyspepsia)      magnesium hydroxide (MILK OF MAGNESIA) 400 MG/5ML suspension Take by mouth daily as needed for Constipation      furosemide (LASIX) 40 MG tablet Take 40 mg by mouth daily      melatonin 3 MG TABS tablet Take 3 mg by mouth nightly as needed (sleeplessness)      metOLazone (ZAROXOLYN) 2.5 MG tablet Take 2.5 mg by mouth daily      mycophenolate (CELLCEPT) 500 MG tablet Take 1,000 mg by mouth nightly      mycophenolate (CELLCEPT) 500 MG tablet Take 500 mg by mouth every morning      omeprazole (PRILOSEC) 20 MG delayed release capsule Take 20 mg by mouth daily      potassium chloride (MICRO-K) 10 MEQ extended release capsule Take 10 mEq by mouth daily      traMADol (ULTRAM) 50 MG tablet Take 50 mg by mouth every 8 hours as needed for Pain.       Plecanatide 3 MG TABS Take 3 mg by mouth daily REVIEW OF SYSTEMS:       Constitutional: Denies fever, sweats, weight loss     Eyes: No visual changes or diplopia. No scleral icterus. ENT: No Headaches, hearing loss or vertigo. No mouth sores or sore throat. Cardiovascular: No chest pain, dyspnea on exertion, palpitations or loss of consciousness. Respiratory: No cough or wheezing, no sputum production. No hemoptysis. .    Gastrointestinal: No abdominal pain, appetite loss, blood in stools. No change in bowel habits. Genitourinary: No dysuria, trouble voiding, or hematuria. Musculoskeletal:  Generalized weakness. No joint complaints. Integumentary: No rash or pruritis. Neurological: No headache, diplopia. No change in gait, balance, or coordination. No paresthesias. Endocrine: No temperature intolerance. No excessive thirst, fluid intake, or urination. Hematologic/Lymphatic: No abnormal bruising or ecchymoses, blood clots or swollen lymph nodes. Allergic/Immunologic: No nasal congestion or hives.      PHYSICAL EXAM:       Vitals:    09/21/22 1200   BP: 95/68   Pulse: 99   Resp: 27   Temp:    SpO2: 91%       General appearance: alert and cooperative  Head: Normocephalic, without obvious abnormality, atraumatic  Neck: No palpable lymphadenopathy in supraclavicular or cervical chains  Lungs: Clear to auscultation bilaterally, no audible rales, wheezes or crackles  Heart: Regular rate and rhythm, S1, S2 normal  Abdomen: Soft, non-tender; bowel sounds normal; no masses,  no organomegaly  Extremities: without cyanosis, clubbing, edema or asymmetry  Skin: No jaundice, purpura or petechiae      LABS:     Lab Results   Component Value Date    WBC 6.5 09/21/2022    HGB 11.0 (L) 09/21/2022    HCT 33.6 (L) 09/21/2022    MCV 88.3 09/21/2022     09/21/2022       Lab Results   Component Value Date    GLUCOSE 83 09/21/2022    BUN 39 (H) 09/21/2022    CREATININE 0.8 09/21/2022    K 3.3 (L) 09/21/2022       Lab Results   Component Value Date    ALKPHOS 140 (H) 09/21/2022    ALT 28 09/21/2022    AST 62 (H) 09/21/2022    BILITOT 0.6 09/21/2022    PROT 5.5 (L) 09/21/2022     IMAGING:     XR CHEST PORTABLE  Result Date: 9/21/2022  Clear lungs. CT CHEST PULMONARY EMBOLISM W CONTRAST  Result Date: 9/21/2022  1. Multiple acute bilateral pulmonary emboli with evidence of right ventricular strain. 2. Disseminated osteolytic disease. The differential includes metastatic disease and myeloma. Acknowledgement of findings was confirmed by Edilberto Velazquez at 10:22 am on 9/21/2022. ASSESSMENT/PLAN:     Lytic Bone Lesions    - noted incidentally on CT scan  - remote history of breast cancer 30-40 years ago  - concern raised for metastatic disease vs myeloma  - no \"CRAB\" criteria present on labs - myeloma seems less likely   - check SPEP, Ig levels, light chains  - check tumor markers  - get CT A/P and bone scan  - request bone biopsy if no other lesions seen on scans    2.   PE    - risk factor is presumed malignancy  - on Lovenox  - can continue Lovenox or switch to NOAC at discharge    Anjelica Kaufman MD

## 2022-09-21 NOTE — PROGRESS NOTES
Called and spoke with radiology, stated they would inform Dr. Norberto Steiner that Dr. Patrick Hewitt is aware of CT scan results.

## 2022-09-21 NOTE — H&P
Hospital Medicine History & Physical      PCP: No primary care provider on file. Date of Admission: 9/21/2022    Date of Service: Pt seen/examined on 9/21/2022    Chief Complaint: Shortness of breath and hypoxia    History Of Present Illness:    67 y.o. female who presented to the hospital with a chief complaint of hypoxic respiratory failure. The patient was transferred from [facility] for hypoxic respiratory failure and concern for a PE. Patient is currently residing at a skilled nursing facility after brief stay at [facility] for diastolic heart failure. She was discharged on 2 L of oxygen. Today while taking a shower the patient had a syncopal episode and does not quite remember what happened. She became short of breath right after her syncopal episode requiring 10 L of oxygen on nonrebreather. She got to [facility] emergency department where she remained tachypneic and hypoxic. There is a concern for a pulmonary embolus her complaint of chest discomfort and an elevated D-dimer of 11 and a mildly elevated troponin 0.1. She had a CT of abdomen pelvis with contrast because she had elevated lipase levels. She was empirically started on a heparin drip because of this concern for ACS versus PE. A CTA was not obtained given a mildly elevated creatinine. The decision was made to transfer the patient to higher level of care. Of note on arrival to [facility] ICU, we were able to wean the patient off nonrebreather and she was stable and satting well on 2 L of oxygen. She endorsed severe anxiety and denied any chest discomfort at this time. She appeared more calm and was awake and alert.     Past Medical History:          Diagnosis Date    Cancer ([address]     CHF (congestive heart failure) (HCC)     Hx of blood clots     Hyperlipidemia        Past Surgical History:          Procedure Laterality Date    BREAST SURGERY         Medications Prior to Admission:      Prior to Admission times daily. Yes Historical Provider, MD   omeprazole (PRILOSEC) 20 MG delayed release capsule Take 20 mg by mouth daily   Yes Historical Provider, MD   potassium chloride (MICRO-K) 10 MEQ extended release capsule Take 10 mEq by mouth daily   Yes Historical Provider, MD   traMADol (ULTRAM) 50 MG tablet Take 50 mg by mouth every 8 hours as needed for Pain. Yes Historical Provider, MD   Plecanatide 3 MG TABS Take 3 mg by mouth daily   Yes Historical Provider, MD       Allergies:  Aspirin, Clavulanic acid, Codeine, and Augmentin [amoxicillin-pot clavulanate]    Social History:    TOBACCO:   reports that she quit smoking about 32 years ago. Her smoking use included cigarettes. She has never used smokeless tobacco.  ETOH:   reports no history of alcohol use. Family History:      History reviewed. No pertinent family history. REVIEW OF SYSTEMS:   Constitutional:  Negative for fever,chills or night sweats  ENT:  Negative for rhinorrhea, epistaxis, hoarseness, sore throat. Respiratory: Positive for shortness of breath  Cardiovascular:   Positive for chest discomfort  Gastrointestinal:  Negative for nausea, vomiting, diarrhea  Genitourinary:  Negative for polyuria, dysuria   Hematologic/Lymphatic:  Negative for  bleeding tendency, easy bruising  Musculoskeletal:  Negative for myalgias and arthralgias  Neurologic:  Negative for  confusion,dysarthria. Skin:  Negative for itching,rash  Psychiatric:  Negative for depression,anxiety, agitation. Endocrine:  Negative for polydipsia,polyuria,heat /cold intolerance. PHYSICAL EXAM:    /69   Pulse (!) 103   Temp 98.8 °F (37.1 °C) (Axillary)   Resp 26   Ht 5' (1.524 m)   Wt 144 lb 10 oz (65.6 kg)   LMP  (LMP Unknown) Comment: post menopausal  SpO2 95%   BMI 28.24 kg/m²   General appearance:  No apparent distress, appears stated age and cooperative. HEENT:  Normal cephalic, atraumatic without obvious deformity. Pupils equal, round, and reactive to light. Extra ocular muscles intact. Conjunctivae/corneas clear. Neck: Supple, with full range of motion. No jugular venous distention. Trachea midline. Respiratory: Tachypneic and diminished breath sounds bilaterally no wheezing appreciated  Cardiovascular: Tachycardic otherwise normal  Abdomen: Soft, non-tender, non-distended with normal bowel sounds. Musculoskeletal:  No clubbing, cyanosis or edema bilaterally. Full range of motion without deformity. Skin: Skin color, texture, turgor normal.  No rashes or lesions. Neurologic: Neurologically intact, no focal deficits noted  Psychiatric: Anxious but alert and oriented and appropriate  Capillary Refill: Brisk,< 3 seconds   Peripheral Pulses: +2 palpable, equal bilaterally       Labs:   Recent Labs     09/21/22 0114   WBC 7.1   HGB 11.2*   HCT 33.9*        Recent Labs     09/21/22 0114   *   K 3.3*   CL 92*   CO2 31   BUN 43*   CREATININE 0.9   CALCIUM 9.4     Recent Labs     09/21/22 0114   AST 66*   ALT 31   BILITOT 0.6   ALKPHOS 144*     No results for input(s): INR in the last 72 hours. Recent Labs     09/21/22 0114   TROPONINI 0.05*         Radiology:   CXR: Reviewed chest x-ray report from [facility]  EKG:  I have reviewed the EKG with the following interpretation: Baseline EKG pending    XR CHEST PORTABLE    (Results Pending)   NM LUNG VENT/PERFUSION (VQ)    (Results Pending)       ASSESSMENT:  Acute respiratory failure with hypoxia  Elevated D-dimer rule out PE  Myasthenia gravis on mycophenolate  Diastolic heart failure, compensated  Hypertension  Hyperlipidemia  Anxiety disorder  Hypokalemia  Hypomagnesemia  Hyponatremia    PLAN:  Patient was transferred on a nonrebreather at 10 L. We have been able to pretty much wean the patient off oxygen. There appeared to be a large anxiety component to her shortness of breath. She did have a syncopal episode and some chest discomfort which led to her anxiety. She denies any concerns at this time. At [facility] Heparin drip was started given an elevated D-dimer of 11, we discontinued heparin and she currently has received 1 dose of treatment dose Lovenox. We will obtain CTA or VQ scan in the a.m. to rule out PE. If this is negative we will discontinue anticoagulation.    -For now continue supportive care and ICU  -Resume her home medications  -Appears compensated, will resume daily Lasix. Hold off on Zaroxolyn for now.  -Continue Coreg  -Xanax as needed for anxiety  -Tramadol for pain  -Replete electrolytes        DVT Prophylaxis: Lovenox  Diet: ADULT DIET; Regular; Low Sodium (2 gm)  Code Status: DNR-CC    Dispo -can likely transfer out of the ICU this a.m. Thank you for the opportunity to be involved in this patient's care. Due to the immediate potential for life-threatening deterioration due to acute respiratory failure, I spent 40 minutes providing critical care.   T      (Please note that portions of this note were completed with a voice recognition program. Efforts were made to edit the dictations but occasionally words are mis-transcribed.)

## 2022-09-21 NOTE — PROGRESS NOTES
4 Eyes Skin Assessment     The patient is being assess for   Admission    I agree that 2 RN's have performed a thorough Head to Toe Skin Assessment on the patient. ALL assessment sites listed below have been assessed. Pt has stage 2 pressure ulcer to left buttock, blanchable right buttock, blanchable b/l heels    Areas assessed for pressure by both nurses:   [x]   Head, Face, and Ears   [x]   Shoulders, Back, and Chest, Abdomen  [x]   Arms, Elbows, and Hands   [x]   Coccyx, Sacrum, and Ischium  [x]   Legs, Feet, and Heels        Skin Assessed Under all Medical Devices by both nurses:  O2 device tubing              All Mepilex Borders were peeled back and area peeked at by both nurses:  No: no mepilex on pt    Please list where Mepilex Borders are located:  none             **SHARE this note so that the co-signing nurse is able to place an eSignature**    Co-signer eSignature: Electronically signed by Beckie Glez RN on 9/21/22 at 3:56 AM EDT    Does the Patient have Skin Breakdown related to pressure?   Yes LDA WOUND CARE was Initiated documentation include the Melonie-wound, Wound Assessment, Measurements, Dressing Treatment, Drainage, and Color\",     (Insert Photo here: refer to media file          Robin Prevention initiated:  Yes   Wound Care Orders initiated:  Yes      [id number]  nurse consulted for Pressure Injury (Stage 3,4, Unstageable, DTI, NWPT, Complex wounds)and New or Established Ostomies:  No      Primary Nurse eSignature: Electronically signed by Bhavik Cramer RN on 9/21/22 at 2:39 AM EDT

## 2022-09-22 ENCOUNTER — APPOINTMENT (OUTPATIENT)
Dept: NUCLEAR MEDICINE | Age: 77
DRG: 175 | End: 2022-09-22
Attending: INTERNAL MEDICINE
Payer: MEDICARE

## 2022-09-22 ENCOUNTER — APPOINTMENT (OUTPATIENT)
Dept: CT IMAGING | Age: 77
DRG: 175 | End: 2022-09-22
Attending: INTERNAL MEDICINE
Payer: MEDICARE

## 2022-09-22 PROBLEM — E78.5 HYPERLIPIDEMIA: Status: ACTIVE | Noted: 2022-09-22

## 2022-09-22 PROBLEM — E44.0 MODERATE PROTEIN-CALORIE MALNUTRITION (HCC): Chronic | Status: ACTIVE | Noted: 2022-09-22

## 2022-09-22 PROBLEM — Z85.3 HISTORY OF BREAST CANCER: Status: ACTIVE | Noted: 2022-09-22

## 2022-09-22 PROBLEM — F41.9 CHRONIC ANXIETY: Status: ACTIVE | Noted: 2022-09-22

## 2022-09-22 PROBLEM — I26.99 OTHER PULMONARY EMBOLISM WITHOUT ACUTE COR PULMONALE (HCC): Status: ACTIVE | Noted: 2022-09-22

## 2022-09-22 PROBLEM — G70.00 MYASTHENIA GRAVIS (HCC): Status: ACTIVE | Noted: 2022-09-22

## 2022-09-22 PROBLEM — J96.01 ACUTE RESPIRATORY FAILURE WITH HYPOXIA (HCC): Status: ACTIVE | Noted: 2022-09-21

## 2022-09-22 LAB
A/G RATIO: 1.1 (ref 1.1–2.2)
ALBUMIN SERPL-MCNC: 3 G/DL (ref 3.4–5)
ALP BLD-CCNC: 137 U/L (ref 40–129)
ALT SERPL-CCNC: 23 U/L (ref 10–40)
ANION GAP SERPL CALCULATED.3IONS-SCNC: 14 MMOL/L (ref 3–16)
AST SERPL-CCNC: 46 U/L (ref 15–37)
BASOPHILS ABSOLUTE: 0.1 K/UL (ref 0–0.2)
BASOPHILS RELATIVE PERCENT: 1.1 %
BILIRUB SERPL-MCNC: 0.6 MG/DL (ref 0–1)
BUN BLDV-MCNC: 38 MG/DL (ref 7–20)
CA 125: 100.5 U/ML (ref 0–35)
CALCIUM SERPL-MCNC: 10.1 MG/DL (ref 8.3–10.6)
CEA: 14.3 NG/ML (ref 0–5)
CHLORIDE BLD-SCNC: 94 MMOL/L (ref 99–110)
CO2: 28 MMOL/L (ref 21–32)
CREAT SERPL-MCNC: 1 MG/DL (ref 0.6–1.2)
EOSINOPHILS ABSOLUTE: 0.1 K/UL (ref 0–0.6)
EOSINOPHILS RELATIVE PERCENT: 3 %
GFR AFRICAN AMERICAN: >60
GFR NON-AFRICAN AMERICAN: 54
GLUCOSE BLD-MCNC: 93 MG/DL (ref 70–99)
HCT VFR BLD CALC: 31.8 % (ref 36–48)
HEMOGLOBIN: 10.7 G/DL (ref 12–16)
IGA: 59 MG/DL (ref 70–400)
IGG: 463 MG/DL (ref 700–1600)
IGM: 40 MG/DL (ref 40–230)
LV EF: 55 %
LVEF MODALITY: NORMAL
LYMPHOCYTES ABSOLUTE: 0.8 K/UL (ref 1–5.1)
LYMPHOCYTES RELATIVE PERCENT: 17.5 %
MCH RBC QN AUTO: 29.8 PG (ref 26–34)
MCHC RBC AUTO-ENTMCNC: 33.6 G/DL (ref 31–36)
MCV RBC AUTO: 88.8 FL (ref 80–100)
MONOCYTES ABSOLUTE: 0.5 K/UL (ref 0–1.3)
MONOCYTES RELATIVE PERCENT: 10.9 %
NEUTROPHILS ABSOLUTE: 3.2 K/UL (ref 1.7–7.7)
NEUTROPHILS RELATIVE PERCENT: 67.5 %
PDW BLD-RTO: 15.8 % (ref 12.4–15.4)
PLATELET # BLD: 197 K/UL (ref 135–450)
PMV BLD AUTO: 9 FL (ref 5–10.5)
POTASSIUM REFLEX MAGNESIUM: 3.6 MMOL/L (ref 3.5–5.1)
RBC # BLD: 3.58 M/UL (ref 4–5.2)
SODIUM BLD-SCNC: 136 MMOL/L (ref 136–145)
TOTAL PROTEIN: 5.7 G/DL (ref 6.4–8.2)
WBC # BLD: 4.8 K/UL (ref 4–11)

## 2022-09-22 PROCEDURE — 6370000000 HC RX 637 (ALT 250 FOR IP): Performed by: INTERNAL MEDICINE

## 2022-09-22 PROCEDURE — 80053 COMPREHEN METABOLIC PANEL: CPT

## 2022-09-22 PROCEDURE — 3430000000 HC RX DIAGNOSTIC RADIOPHARMACEUTICAL: Performed by: INTERNAL MEDICINE

## 2022-09-22 PROCEDURE — 99233 SBSQ HOSP IP/OBS HIGH 50: CPT | Performed by: INTERNAL MEDICINE

## 2022-09-22 PROCEDURE — A9503 TC99M MEDRONATE: HCPCS | Performed by: INTERNAL MEDICINE

## 2022-09-22 PROCEDURE — 2060000000 HC ICU INTERMEDIATE R&B

## 2022-09-22 PROCEDURE — 94761 N-INVAS EAR/PLS OXIMETRY MLT: CPT

## 2022-09-22 PROCEDURE — 74177 CT ABD & PELVIS W/CONTRAST: CPT

## 2022-09-22 PROCEDURE — 6360000002 HC RX W HCPCS: Performed by: INTERNAL MEDICINE

## 2022-09-22 PROCEDURE — 6360000004 HC RX CONTRAST MEDICATION: Performed by: INTERNAL MEDICINE

## 2022-09-22 PROCEDURE — 78306 BONE IMAGING WHOLE BODY: CPT | Performed by: INTERNAL MEDICINE

## 2022-09-22 PROCEDURE — 93306 TTE W/DOPPLER COMPLETE: CPT

## 2022-09-22 PROCEDURE — 36415 COLL VENOUS BLD VENIPUNCTURE: CPT

## 2022-09-22 PROCEDURE — 2580000003 HC RX 258: Performed by: INTERNAL MEDICINE

## 2022-09-22 PROCEDURE — 2700000000 HC OXYGEN THERAPY PER DAY

## 2022-09-22 PROCEDURE — 85025 COMPLETE CBC W/AUTO DIFF WBC: CPT

## 2022-09-22 RX ORDER — TC 99M MEDRONATE 20 MG/10ML
25.1 INJECTION, POWDER, LYOPHILIZED, FOR SOLUTION INTRAVENOUS ONCE
Status: COMPLETED | OUTPATIENT
Start: 2022-09-22 | End: 2022-09-22

## 2022-09-22 RX ADMIN — CETIRIZINE HYDROCHLORIDE 5 MG: 10 TABLET ORAL at 09:41

## 2022-09-22 RX ADMIN — ONDANSETRON 4 MG: 4 TABLET, ORALLY DISINTEGRATING ORAL at 12:13

## 2022-09-22 RX ADMIN — MYCOPHENOLATE MOFETIL 1000 MG: 250 CAPSULE ORAL at 20:42

## 2022-09-22 RX ADMIN — MYCOPHENOLATE MOFETIL 500 MG: 250 CAPSULE ORAL at 09:42

## 2022-09-22 RX ADMIN — ENOXAPARIN SODIUM 70 MG: 100 INJECTION SUBCUTANEOUS at 20:43

## 2022-09-22 RX ADMIN — CARVEDILOL 6.25 MG: 6.25 TABLET, FILM COATED ORAL at 09:41

## 2022-09-22 RX ADMIN — ACETAMINOPHEN 650 MG: 325 TABLET ORAL at 12:37

## 2022-09-22 RX ADMIN — TC 99M MEDRONATE 25.1 MILLICURIE: 20 INJECTION, POWDER, LYOPHILIZED, FOR SOLUTION INTRAVENOUS at 10:20

## 2022-09-22 RX ADMIN — ENOXAPARIN SODIUM 70 MG: 100 INJECTION SUBCUTANEOUS at 09:43

## 2022-09-22 RX ADMIN — DIATRIZOATE MEGLUMINE AND DIATRIZOATE SODIUM 12 ML: 660; 100 LIQUID ORAL; RECTAL at 12:51

## 2022-09-22 RX ADMIN — ATORVASTATIN CALCIUM 10 MG: 10 TABLET, FILM COATED ORAL at 20:42

## 2022-09-22 RX ADMIN — FENOFIBRATE 160 MG: 160 TABLET ORAL at 09:43

## 2022-09-22 RX ADMIN — SODIUM CHLORIDE, PRESERVATIVE FREE 10 ML: 5 INJECTION INTRAVENOUS at 20:43

## 2022-09-22 RX ADMIN — CARVEDILOL 6.25 MG: 6.25 TABLET, FILM COATED ORAL at 18:31

## 2022-09-22 RX ADMIN — FERROUS SULFATE TAB 325 MG (65 MG ELEMENTAL FE) 325 MG: 325 (65 FE) TAB at 09:41

## 2022-09-22 NOTE — PROGRESS NOTES
Pulmonary Progress Note    CC: Acute PE, hypoxia    Subjective:  still has some SOB. C/o back pain      EXAM: /62   Pulse 91   Temp 98.1 °F (36.7 °C) (Axillary)   Resp 18   Ht 5' (1.524 m)   Wt 144 lb 10 oz (65.6 kg)   LMP  (LMP Unknown) Comment: post menopausal  SpO2 90%   BMI 28.24 kg/m²  on 4L  Constitutional:  No acute distress. Eyes: PERRL. Conjunctivae anicteric. ENT: Normal nose. Normal tongue. Neck:  Trachea is midline. No thyroid tenderness. Respiratory: No accessory muscle usage. decreased breath sounds. No wheezes. No rales. No Rhonchi. Cardiovascular: Normal S1S2. No digit clubbing. No digit cyanosis. No LE edema. Psychiatric: No anxiety or Agitation. Alert and Oriented to person, place and time.     Scheduled Meds:   sodium chloride flush  5-40 mL IntraVENous 2 times per day    enoxaparin  1 mg/kg SubCUTAneous BID    atorvastatin  10 mg Oral Nightly    carvedilol  6.25 mg Oral BID WC    fenofibrate  160 mg Oral Daily    ferrous sulfate  325 mg Oral Daily with breakfast    furosemide  40 mg Oral Daily    cetirizine  5 mg Oral Daily    mycophenolate  1,000 mg Oral Nightly    mycophenolate  500 mg Oral QAM     Continuous Infusions:   sodium chloride       PRN Meds:  sodium chloride flush, sodium chloride, ondansetron **OR** ondansetron, polyethylene glycol, acetaminophen **OR** acetaminophen, potassium chloride **OR** potassium alternative oral replacement **OR** potassium chloride, magnesium sulfate, ipratropium-albuterol, ALPRAZolam, traMADol, docusate sodium, melatonin    Labs:  CBC:   Recent Labs     09/21/22  0114 09/21/22  0610 09/22/22  0455   WBC 7.1 6.5 4.8   HGB 11.2* 11.0* 10.7*   HCT 33.9* 33.6* 31.8*   MCV 89.2 88.3 88.8    233 197     BMP:   Recent Labs     09/21/22  0114 09/21/22  0610 09/22/22  0455   * 134* 136   K 3.3* 3.3* 3.6   CL 92* 92* 94*   CO2 31 27 28   BUN 43* 39* 38*   CREATININE 0.9 0.8 1.0     CTPA  Pulmonary Arteries: Pulmonary arteries are adequately opacified for   evaluation. There are multiple bilateral occlusive and partially occlusive   intraluminal filling defects within the distal main and bilateral segmental   pulmonary arterial vessels. The main pulmonary artery is normal in caliber. There is evidence of right ventricular strain with retrograde flow of   contrast into the intrahepatic venous system. Mediastinum: No evidence of mediastinal lymphadenopathy. The heart and   pericardium demonstrate no acute abnormality. There is no acute abnormality   of the thoracic aorta. There is a moderate-sized hiatal hernia. Lungs/pleura: The lungs are without acute process. No focal consolidation or   pulmonary edema. No evidence of pleural effusion or pneumothorax. Upper Abdomen: Limited images of the upper abdomen are unremarkable. Soft Tissues/Bones: There are multiple lytic lesions scattered throughout the   sternum and bilateral ribs with several remote healed pathologic fractures. Chronic severe multilevel wedge compression fractures are present within the   thoracic spine status post kyphoplasty. Impression   1. Multiple acute bilateral pulmonary emboli with evidence of right   ventricular strain. 2. Disseminated osteolytic disease. The differential includes metastatic   disease and myeloma.          ASSESSMENT:  Acute bilateral PEs possible secondary to malignancy  Acute hypoxic respiratory failure   Osteolytic lesions  Back pain  Chronic anxiety on xanax  Abd pain  Myasthenia gravis on cellcept     PLAN:  Supplemental oxygen to maintain SaO2 >92%; wean as tolerated    TTE  Therapeutic lovenox - ok to switch to noac and will need life long therapy if tolerated  Bone scan per Oncology  [id number] for PCU

## 2022-09-22 NOTE — PROGRESS NOTES
Spoke with pt who requested daughter be called. Pt daughter called and discussed plan of care. Daughter states that both she and pt would like to have scans completed to find out more about cancer. Aware pt to go down around 1-130 for scans to begin. Aware pt to consume contrast prior to scan. Discussed premedication of Zofran to counteract any nausea as pt has had low appetite lately and states she cant consume much at all. Pt only had small orange juice this morning and declined any food. Pt awake in bed. Pt states no needs at this time. Pt call light in reach. Pt knows to notify RN if there are any needs. All lines and monitoring devices in place. Bed alarm on. Lateral rotation enacted on bed.

## 2022-09-22 NOTE — PROGRESS NOTES
4 Eyes Skin Assessment     The patient is being assess for   Transfer to New Unit    I agree that 2 RN's have performed a thorough Head to Toe Skin Assessment on the patient. ALL assessment sites listed below have been assessed. Areas assessed for pressure by both nurses:   [x]   Head, Face, and Ears   [x]   Shoulders, Back, and Chest, Abdomen  [x]   Arms, Elbows, and Hands   [x]   Coccyx, Sacrum, and Ischium  [x]   Legs, Feet, and Heels        Skin Assessed Under all Medical Devices by both nurses:  O2 device tubing              All Mepilex Borders were peeled back and area peeked at by both nurses:  Yes  Please list where Mepilex Borders are located:  Sacrum, heels             **SHARE this note so that the co-signing nurse is able to place an eSignature**    Co-signer eSignature: Electronically signed by Tyrone Alberto RN on 9/22/22 at 7:23 PM EDT    Does the Patient have Skin Breakdown related to pressure?   Yes LDA WOUND CARE was Initiated documentation include the Melonie-wound, Wound Assessment, Measurements, Dressing Treatment, Drainage, and Color\",     (Ins   ert Photo here     Robin Prevention initiated:  Yes   Wound Care Orders initiated:  Yes      [id number] nurse consulted for Pressure Injury (Stage 3,4, Unstageable, DTI, NWPT, Complex wounds)and New or Established Ostomies:  NA      Primary Nurse eSignature: Electronically signed by Meghan Castorena RN on 9/22/22 at 7:06 PM EDT

## 2022-09-22 NOTE — PROGRESS NOTES
Internal Medicine ICU Progress Note      Events of Last 24 hours:     50-year-old white female who is a poor historian admitted to hospital for shortness of breath. Work-up consistent with acute pulmonary embolism. She is hypoxic. Has complaints of back pain. She has a poor appetite. Work-up also showed metastatic bone lesions. Etiology of this is unclear. Remote history of breast cancer 30 to 40 years ago. Oncology seen the patient. Further diagnostic testing including bone scan and CT of the abdomen and pelvis ordered today. Family like to find source of metastatic bone lesions if possible. Invasive Lines: PIV        MV: N/A    No results for input(s): PHART, CVQ0HWY, PO2ART in the last 72 hours. MV Settings:     / / /     IV:   sodium chloride         Vitals:  Temp  Av.5 °F (36.9 °C)  Min: 97.9 °F (36.6 °C)  Max: 99.4 °F (37.4 °C)  Pulse  Av.6  Min: 86  Max: 100  BP  Min: 69/57  Max: 133/83  SpO2  Av.5 %  Min: 82 %  Max: 98 %  Patient Vitals for the past 4 hrs:   BP Temp Temp src Pulse Resp SpO2   22 0902 131/68 -- -- 98 21 97 %   22 0800 117/62 98.1 °F (36.7 °C) Axillary 91 18 90 %   22 0700 117/71 -- -- 94 21 92 %       CVP:        Intake/Output Summary (Last 24 hours) at 2022 1009  Last data filed at 2022 0600  Gross per 24 hour   Intake 758 ml   Output 950 ml   Net -192 ml       EXAM:  General: Awake and appears weak. Ill-appearing. Eyes: PERRL. No sclera icterus. No conjunctiva injected. ENT: No discharge. Pharynx clear. Neck: Trachea midline. Normal thyroid. Resp: No accessory muscle use. No crackles. No wheezing. No rhonchi. No dullness on percussion. CV: Regular rate. Regular rhythm. No mumur or rub. No edema. No JVD. Palpable pedal pulses. GI: Non-tender. Non-distended. No masses. No organmegaly. Normal bowel sounds. No hernia. Skin: Warm and dry. No nodule on exposed extremities. No rash on exposed extremities.   Lymph: No cervical LAD. No supraclavicular LAD. M/S: No cyanosis. No joint deformity. No clubbing. Neuro: Awake. Follows commands. Positive pupils/gag/corneals. Normal pain response. Psych: Oriented to person, place, time. No anxiety or agitation. Medications:  Scheduled Meds:   sodium chloride flush  5-40 mL IntraVENous 2 times per day    enoxaparin  1 mg/kg SubCUTAneous BID    atorvastatin  10 mg Oral Nightly    carvedilol  6.25 mg Oral BID WC    fenofibrate  160 mg Oral Daily    ferrous sulfate  325 mg Oral Daily with breakfast    cetirizine  5 mg Oral Daily    mycophenolate  1,000 mg Oral Nightly    mycophenolate  500 mg Oral QAM       PRN Meds:  sodium chloride flush, sodium chloride, ondansetron **OR** ondansetron, polyethylene glycol, acetaminophen **OR** acetaminophen, potassium chloride **OR** potassium alternative oral replacement **OR** potassium chloride, magnesium sulfate, ipratropium-albuterol, ALPRAZolam, traMADol, docusate sodium, melatonin    Results:  CBC:   Recent Labs     09/21/22  0114 09/21/22  0610 09/22/22  0455   WBC 7.1 6.5 4.8   HGB 11.2* 11.0* 10.7*   HCT 33.9* 33.6* 31.8*   MCV 89.2 88.3 88.8    233 197     BMP:   Recent Labs     09/21/22  0114 09/21/22  0610 09/22/22  0455   * 134* 136   K 3.3* 3.3* 3.6   CL 92* 92* 94*   CO2 31 27 28   BUN 43* 39* 38*   CREATININE 0.9 0.8 1.0     LIVER PROFILE:   Recent Labs     09/21/22  0114 09/21/22  0610 09/21/22  1833 09/22/22  0455   AST 66* 62*  --  46*   ALT 31 28  --  23   LIPASE  --  175.0* 142.0*  --    BILITOT 0.6 0.6  --  0.6   ALKPHOS 144* 140*  --  137*         Cultures:  COVID-19 negative    Films:    CT CHEST PULMONARY EMBOLISM W CONTRAST   Final Result   1. Multiple acute bilateral pulmonary emboli with evidence of right   ventricular strain. 2. Disseminated osteolytic disease. The differential includes metastatic   disease and myeloma. Acknowledgement of findings was confirmed by Rhea Chaves at 10:22 am on   9/21/2022. XR CHEST PORTABLE   Final Result   Clear lungs. NM BONE SCAN WHOLE BODY    (Results Pending)   CT ABDOMEN PELVIS W IV CONTRAST Additional Contrast? Oral    (Results Pending)          Assessment:    Principal Problem:    Other pulmonary embolism without acute cor pulmonale (HCC)  Active Problems:    Acute respiratory failure with hypoxia (HCC)    Hyperlipidemia    History of breast cancer    Chronic anxiety    Myasthenia gravis ([facility] [address]  Resolved Problems:    * No resolved hospital problems. *       Plan:    #Acute bilateral pulmonary embolism likely related to underlying malignancy. Started on Lovenox. Can switch to Xarelto or Eliquis. #Osteolytic bone lesions. Etiology unclear. Bone scan and CT abdomen and pelvis ordered by oncology. #Myasthenia gravis on CellCept. #Acute respiratory failure due to pulmonary embolism on supplemental oxygen. #Chronic anxiety on Xanax. #Generalized weakness consult PT and OT. #She is a DNR CC.    PCU patient boarding in ICU. All questions and concerns were addressed to the patient/family. Alternatives to my treatment were discussed. The note was completed using EMR. Every effort was made to ensure accuracy; however, inadvertent computerized transcription errors may be present.          Vidya Talley MD 10:09 AM 9/22/2022

## 2022-09-22 NOTE — PROGRESS NOTES
RT Nebulizer Bronchodilator Protocol Note    There is a bronchodilator order in the chart from a provider indicating to follow the RT Bronchodilator Protocol and there is an Initiate RT Bronchodilator Protocol order as well (see protocol at bottom of note). CXR Findings:  XR CHEST PORTABLE    Result Date: 9/21/2022  Clear lungs. The findings from the last RT Protocol Assessment were as follows:  Smoking: (P) Smoker 15 pack years or more  Respiratory Pattern: (P) Regular pattern and RR 12-20 bpm  Breath Sounds: (P) Clear breath sounds  Cough: (P) Strong, spontaneous, non-productive  Indication for Bronchodilator Therapy: (P) None  Bronchodilator Assessment Score: (P) 1    Aerosolized bronchodilator medication orders have been revised according to the RT Nebulizer Bronchodilator Protocol below. Respiratory Therapist to perform RT Therapy Protocol Assessment initially then follow the protocol. Repeat RT Therapy Protocol Assessment PRN for score 0-3 or on second treatment, BID, and PRN for scores above 3. No Indications - adjust the frequency to every 6 hours PRN wheezing or bronchospasm, if no treatments needed after 48 hours then discontinue using Per Protocol order mode. If indication present, adjust the RT bronchodilator orders based on the Bronchodilator Assessment Score as indicated below. If a patient is on this medication at home then do not decrease Frequency below that used at home. 0-3 - enter or revise RT bronchodilator order(s) to equivalent RT Bronchodilator order with Frequency of every 4 hours PRN for wheezing or increased work of breathing using Per Protocol order mode. 4-6 - enter or revise RT Bronchodilator order(s) to two equivalent RT bronchodilator orders with one order with BID Frequency and one order with Frequency of every 4 hours PRN wheezing or increased work of breathing using Per Protocol order mode.          7-10 - enter or revise RT Bronchodilator order(s) to two equivalent RT bronchodilator orders with one order with TID Frequency and one order with Frequency of every 4 hours PRN wheezing or increased work of breathing using Per Protocol order mode. 11-13 - enter or revise RT Bronchodilator order(s) to one equivalent RT bronchodilator order with QID Frequency and an Albuterol order with Frequency of every 4 hours PRN wheezing or increased work of breathing using Per Protocol order mode. Greater than 13 - enter or revise RT Bronchodilator order(s) to one equivalent RT bronchodilator order with every 4 hours Frequency and an Albuterol order with Frequency of every 2 hours PRN wheezing or increased work of breathing using Per Protocol order mode. RT to enter RT Home Evaluation for COPD & MDI Assessment order using Per Protocol order mode.     Electronically signed by Tiarra Justin RCP on 9/22/2022 at 4:12 PM

## 2022-09-22 NOTE — PROGRESS NOTES
Bedside report and transfer of care given to [facility]. Pt currently resting in bed with the call light within reach. Pt denies any other care needs at this time. Pt stable at this time. No

## 2022-09-22 NOTE — PROGRESS NOTES
Comprehensive Nutrition Assessment    Type and Reason for Visit:  Initial, Positive Nutrition Screen (+ screen for MST = 2 and stage 2 wound)    Nutrition Recommendations/Plan:   Continue ADULT DIET; Regular; Low Sodium diet order. Started vanilla magic cups with meals + as HS snack and started vanilla Ensure Compact mixed with ice cream for a shake with meals + as HS snack. Monitor appetite, meal intake, and acceptance/intake of ONS. Please obtain an updated weight for this patient to monitor weight trends during this admission. Monitor nutrition-related labs, bowel function, and weight trends. Malnutrition Assessment:  Malnutrition Status: Moderate malnutrition (09/22/22 1238)    Context:  Chronic Illness     Findings of the 6 clinical characteristics of malnutrition:  Energy Intake:  75% or less estimated energy requirements for 1 month or longer  Weight Loss:  Unable to assess     Body Fat Loss:  Mild body fat loss Orbital, Triceps   Muscle Mass Loss:  Mild muscle mass loss Temples (temporalis), Clavicles (pectoralis & deltoids), Calf (gastrocnemius), Hand (interosseous)  Fluid Accumulation:  No significant fluid accumulation     Strength:  Not Performed    Nutrition Assessment:    patient is nutritionally compromised AEB decreased appetite/po intake and early satiety x a few months PTA and she is at risk for further compromise d/t ongoing decreased appetite/po intake, early satiety, and wounds noted; will continue ADULT DIET; Regular;  Low Sodium diet order and added vanilla magic cups with meals and as an HS snack + vanilla Ensure Compact mixed with ice cream for a milkshake with meals and as an HS snack    Nutrition Related Findings:    patient is A & O x 4; patient presented to ED from [facility]; she reported that her appetite and the sensation of early satiety has been going on for \"months\"; she stated that she takes a few bites and then feels full + she sometimes feels - 1625 ml    Nutrition Diagnosis:   Moderate malnutrition related to inadequate protein-energy intake, altered GI function, increase demand for energy/nutrients, early satiety as evidenced by poor intake prior to admission, intake 0-25%, wounds, mild loss of subcutaneous fat, mild muscle loss, lab values, GI abnormality, nausea    Nutrition Interventions:   Food and/or Nutrient Delivery: Continue Current Diet, Start Oral Nutrition Supplement  Nutrition Education/Counseling: No recommendation at this time  Coordination of Nutrition Care: Continue to monitor while inpatient, Interdisciplinary Rounds  Plan of Care discussed with: ICU Team    Goals:     Goals: PO intake 50% or greater, by next RD assessment       Nutrition Monitoring and Evaluation:   Behavioral-Environmental Outcomes: None Identified  Food/Nutrient Intake Outcomes: Food and Nutrient Intake, Supplement Intake  Physical Signs/Symptoms Outcomes: Biochemical Data, GI Status, Nausea or Vomiting, Meal Time Behavior, Nutrition Focused Physical Findings, Skin, Weight    Discharge Planning:    Continue current diet     Loretta Jj, [address] [place] [address], LD  Contact: [phone]

## 2022-09-22 NOTE — PROGRESS NOTES
ONCOLOGY FOLLOW-UP:         PROBLEM LIST:       Patient Active Problem List   Diagnosis Code    Acute respiratory failure ([facility] [address] J96.00       INTERVAL HISTORY:       Pt remains admitted in ICU. Afebrile this AM. Pt appears quite weak and somnolent. Opens eyes then drifts back to sleep. REVIEW OF SYSTEMS:       Unable to assess. PHYSICAL EXAM:       Vitals:    09/22/22 0700   BP: 117/71   Pulse: 94   Resp: 21   Temp:    SpO2: 92%       General appearance: drowsy  Head: Normocephalic, without obvious abnormality, atraumatic  Neck: No palpable lymphadenopathy in supraclavicular or cervical chains  Lungs: Clear to auscultation bilaterally, no audible rales, wheezes or crackles  Heart: Regular rate and rhythm, S1, S2 normal  Abdomen: Soft, non-tender; bowel sounds normal; no masses,  no organomegaly  Extremities: without cyanosis, clubbing, edema or asymmetry  Skin: No jaundice, purpura or petechiae      LABS:     Lab Results   Component Value Date    WBC 4.8 09/22/2022    HGB 10.7 (L) 09/22/2022    HCT 31.8 (L) 09/22/2022    MCV 88.8 09/22/2022     09/22/2022       Lab Results   Component Value Date    GLUCOSE 93 09/22/2022    BUN 38 (H) 09/22/2022    CREATININE 1.0 09/22/2022    K 3.6 09/22/2022       Lab Results   Component Value Date    ALKPHOS 137 (H) 09/22/2022    ALT 23 09/22/2022    AST 46 (H) 09/22/2022    BILITOT 0.6 09/22/2022    PROT 5.7 (L) 09/22/2022     Lipase 142    CEA - pending     - pending     - pending    Ig levels - pending    SPEP - pending    SFLC - pending    CA 19-9 - pending    CA 27.29 - pending    IMAGING:     XR CHEST PORTABLE  Result Date: 9/21/2022  Clear lungs. CT CHEST PULMONARY EMBOLISM W CONTRAST  Result Date: 9/21/2022  1. Multiple acute bilateral pulmonary emboli with evidence of right ventricular strain. 2. Disseminated osteolytic disease. The differential includes metastatic disease and myeloma.  Acknowledgement of findings was confirmed by [facility]

## 2022-09-22 NOTE — CARE COORDINATION
INTERDISCIPLINARY PLAN OF CARE CONFERENCE    Date/Time: 9/22/2022 9:18 AM  Completed by: ROSSY Azul   Case Management      Patient Name:  Tara Carpenter  YOB: 1945  Admitting Diagnosis: Acute respiratory failure ([facility] [address] [J96.00]  Acute on chronic respiratory failure ([facility] [address] [J96.20]     Admit Date/Time:  9/21/2022 12:11 AM    Chart reviewed. Interdisciplinary team contacted or reviewed plan related to patient progress and discharge plans. Disciplines included Case Management, Nursing, and Dietitian. Current Status:ongoing   PT/OT recommendation for discharge plan of care: TBD     Expected D/C Disposition:  Makenzie Martin   Confirmed plan with patient and/or family Yes confirmed with: (name) pt  Met with:pt    Discharge Plan Comments: Chart review completed. Completed Interdisciplinary rounds with ICU staff. Met with pt at bedside. She confirmed she will return to [facility] when able. Pt is a bed hold private pay at [facility] per [facility] [place].    CM will continue to follow and assist. Please notify CM if needs or concerns arise.     Home O2 in place on admit: per facility if needed    Addendum at 1:17pm: Carol Martin was in the ICU meeting with pt and stated pt doesn't need PT/OT to return

## 2022-09-23 ENCOUNTER — APPOINTMENT (OUTPATIENT)
Dept: GENERAL RADIOLOGY | Age: 77
DRG: 175 | End: 2022-09-23
Attending: INTERNAL MEDICINE
Payer: MEDICARE

## 2022-09-23 LAB
A/G RATIO: 1.7 (ref 1.1–2.2)
ALBUMIN SERPL-MCNC: 2.4 G/DL (ref 3.1–4.9)
ALBUMIN SERPL-MCNC: 3.2 G/DL (ref 3.4–5)
ALP BLD-CCNC: 134 U/L (ref 40–129)
ALPHA-1-GLOBULIN: 0.4 G/DL (ref 0.2–0.4)
ALPHA-2-GLOBULIN: 1 G/DL (ref 0.4–1.1)
ALT SERPL-CCNC: 19 U/L (ref 10–40)
ANION GAP SERPL CALCULATED.3IONS-SCNC: 14 MMOL/L (ref 3–16)
AST SERPL-CCNC: 43 U/L (ref 15–37)
BASE EXCESS ARTERIAL: 4.1 MMOL/L (ref -3–3)
BASOPHILS ABSOLUTE: 0 K/UL (ref 0–0.2)
BASOPHILS RELATIVE PERCENT: 1 %
BETA GLOBULIN: 1 G/DL (ref 0.9–1.6)
BILIRUB SERPL-MCNC: 0.5 MG/DL (ref 0–1)
BUN BLDV-MCNC: 42 MG/DL (ref 7–20)
CALCIUM SERPL-MCNC: 9.8 MG/DL (ref 8.3–10.6)
CARBOXYHEMOGLOBIN ARTERIAL: 0.3 % (ref 0–1.5)
CHLORIDE BLD-SCNC: 96 MMOL/L (ref 99–110)
CO2: 29 MMOL/L (ref 21–32)
CREAT SERPL-MCNC: 1.2 MG/DL (ref 0.6–1.2)
EOSINOPHILS ABSOLUTE: 0.2 K/UL (ref 0–0.6)
EOSINOPHILS RELATIVE PERCENT: 3.9 %
GAMMA GLOBULIN: 0.4 G/DL (ref 0.6–1.8)
GFR AFRICAN AMERICAN: 53
GFR NON-AFRICAN AMERICAN: 43
GLUCOSE BLD-MCNC: 100 MG/DL (ref 70–99)
HCO3 ARTERIAL: 28.3 MMOL/L (ref 21–29)
HCT VFR BLD CALC: 31.6 % (ref 36–48)
HEMOGLOBIN, ART, EXTENDED: 11.5 G/DL (ref 12–16)
HEMOGLOBIN: 10.3 G/DL (ref 12–16)
KAPPA, FREE LIGHT CHAINS, SERUM: 15.26 MG/L (ref 3.3–19.4)
KAPPA/LAMBDA RATIO: 1.32 (ref 0.26–1.65)
KAPPA/LAMBDA TEST COMMENT: NORMAL
LAMBDA, FREE LIGHT CHAINS, SERUM: 11.52 MG/L (ref 5.71–26.3)
LYMPHOCYTES ABSOLUTE: 0.9 K/UL (ref 1–5.1)
LYMPHOCYTES RELATIVE PERCENT: 20.7 %
MCH RBC QN AUTO: 29.2 PG (ref 26–34)
MCHC RBC AUTO-ENTMCNC: 32.7 G/DL (ref 31–36)
MCV RBC AUTO: 89.5 FL (ref 80–100)
METHEMOGLOBIN ARTERIAL: 0 %
MONOCYTES ABSOLUTE: 0.4 K/UL (ref 0–1.3)
MONOCYTES RELATIVE PERCENT: 9.9 %
NEUTROPHILS ABSOLUTE: 2.8 K/UL (ref 1.7–7.7)
NEUTROPHILS RELATIVE PERCENT: 64.5 %
O2 SAT, ARTERIAL: 96.6 %
O2 THERAPY: ABNORMAL
PCO2 ARTERIAL: 40.7 MMHG (ref 35–45)
PDW BLD-RTO: 16.3 % (ref 12.4–15.4)
PH ARTERIAL: 7.46 (ref 7.35–7.45)
PLATELET # BLD: 185 K/UL (ref 135–450)
PMV BLD AUTO: 9.4 FL (ref 5–10.5)
PO2 ARTERIAL: 82.5 MMHG (ref 75–108)
POTASSIUM REFLEX MAGNESIUM: 3.6 MMOL/L (ref 3.5–5.1)
RBC # BLD: 3.53 M/UL (ref 4–5.2)
SODIUM BLD-SCNC: 139 MMOL/L (ref 136–145)
SPE/IFE INTERPRETATION: NORMAL
TCO2 ARTERIAL: 29.5 MMOL/L
TOTAL PROTEIN: 5.1 G/DL (ref 6.4–8.2)
TOTAL PROTEIN: 5.2 G/DL (ref 6.4–8.2)
WBC # BLD: 4.4 K/UL (ref 4–11)

## 2022-09-23 PROCEDURE — 2500000003 HC RX 250 WO HCPCS: Performed by: INTERNAL MEDICINE

## 2022-09-23 PROCEDURE — 6370000000 HC RX 637 (ALT 250 FOR IP): Performed by: INTERNAL MEDICINE

## 2022-09-23 PROCEDURE — 2700000000 HC OXYGEN THERAPY PER DAY

## 2022-09-23 PROCEDURE — 85025 COMPLETE CBC W/AUTO DIFF WBC: CPT

## 2022-09-23 PROCEDURE — 80053 COMPREHEN METABOLIC PANEL: CPT

## 2022-09-23 PROCEDURE — 94761 N-INVAS EAR/PLS OXIMETRY MLT: CPT

## 2022-09-23 PROCEDURE — 2580000003 HC RX 258: Performed by: INTERNAL MEDICINE

## 2022-09-23 PROCEDURE — 36600 WITHDRAWAL OF ARTERIAL BLOOD: CPT

## 2022-09-23 PROCEDURE — 99233 SBSQ HOSP IP/OBS HIGH 50: CPT | Performed by: INTERNAL MEDICINE

## 2022-09-23 PROCEDURE — 71045 X-RAY EXAM CHEST 1 VIEW: CPT

## 2022-09-23 PROCEDURE — 82803 BLOOD GASES ANY COMBINATION: CPT

## 2022-09-23 PROCEDURE — 6360000002 HC RX W HCPCS: Performed by: INTERNAL MEDICINE

## 2022-09-23 PROCEDURE — 2060000000 HC ICU INTERMEDIATE R&B

## 2022-09-23 PROCEDURE — 36415 COLL VENOUS BLD VENIPUNCTURE: CPT

## 2022-09-23 RX ORDER — FLUTICASONE PROPIONATE 50 MCG
2 SPRAY, SUSPENSION (ML) NASAL DAILY
Status: DISCONTINUED | OUTPATIENT
Start: 2022-09-23 | End: 2022-09-27 | Stop reason: HOSPADM

## 2022-09-23 RX ORDER — FUROSEMIDE 10 MG/ML
20 INJECTION INTRAMUSCULAR; INTRAVENOUS ONCE
Status: COMPLETED | OUTPATIENT
Start: 2022-09-23 | End: 2022-09-23

## 2022-09-23 RX ADMIN — ACETAMINOPHEN 650 MG: 325 TABLET ORAL at 21:52

## 2022-09-23 RX ADMIN — MYCOPHENOLATE MOFETIL 1000 MG: 250 CAPSULE ORAL at 21:10

## 2022-09-23 RX ADMIN — CETIRIZINE HYDROCHLORIDE 5 MG: 10 TABLET ORAL at 08:58

## 2022-09-23 RX ADMIN — ACETAMINOPHEN 650 MG: 325 TABLET ORAL at 15:49

## 2022-09-23 RX ADMIN — FUROSEMIDE 20 MG: 10 INJECTION, SOLUTION INTRAMUSCULAR; INTRAVENOUS at 12:15

## 2022-09-23 RX ADMIN — ENOXAPARIN SODIUM 70 MG: 100 INJECTION SUBCUTANEOUS at 21:10

## 2022-09-23 RX ADMIN — FLUTICASONE PROPIONATE 2 SPRAY: 50 SPRAY, METERED NASAL at 10:13

## 2022-09-23 RX ADMIN — MICONAZOLE NITRATE: 20 POWDER TOPICAL at 21:30

## 2022-09-23 RX ADMIN — FERROUS SULFATE TAB 325 MG (65 MG ELEMENTAL FE) 325 MG: 325 (65 FE) TAB at 08:57

## 2022-09-23 RX ADMIN — ACETAMINOPHEN 650 MG: 325 TABLET ORAL at 02:25

## 2022-09-23 RX ADMIN — CARVEDILOL 6.25 MG: 6.25 TABLET, FILM COATED ORAL at 18:22

## 2022-09-23 RX ADMIN — SODIUM CHLORIDE, PRESERVATIVE FREE 10 ML: 5 INJECTION INTRAVENOUS at 08:59

## 2022-09-23 RX ADMIN — MYCOPHENOLATE MOFETIL 500 MG: 250 CAPSULE ORAL at 08:58

## 2022-09-23 RX ADMIN — ATORVASTATIN CALCIUM 10 MG: 10 TABLET, FILM COATED ORAL at 21:10

## 2022-09-23 RX ADMIN — ACETAMINOPHEN 650 MG: 325 TABLET ORAL at 09:03

## 2022-09-23 RX ADMIN — SODIUM CHLORIDE, PRESERVATIVE FREE 10 ML: 5 INJECTION INTRAVENOUS at 21:51

## 2022-09-23 RX ADMIN — FENOFIBRATE 160 MG: 160 TABLET ORAL at 08:58

## 2022-09-23 RX ADMIN — ENOXAPARIN SODIUM 70 MG: 100 INJECTION SUBCUTANEOUS at 08:59

## 2022-09-23 ASSESSMENT — PAIN SCALES - WONG BAKER: WONGBAKER_NUMERICALRESPONSE: 0

## 2022-09-23 ASSESSMENT — PAIN DESCRIPTION - DESCRIPTORS
DESCRIPTORS: ACHING;CRAMPING;DISCOMFORT;NAGGING
DESCRIPTORS: SHARP;STABBING
DESCRIPTORS: PRESSURE

## 2022-09-23 ASSESSMENT — PAIN SCALES - GENERAL
PAINLEVEL_OUTOF10: 8
PAINLEVEL_OUTOF10: 10
PAINLEVEL_OUTOF10: 10
PAINLEVEL_OUTOF10: 3
PAINLEVEL_OUTOF10: 8

## 2022-09-23 ASSESSMENT — PAIN DESCRIPTION - LOCATION
LOCATION: OTHER (COMMENT)
LOCATION: RIB CAGE
LOCATION: RIB CAGE

## 2022-09-23 ASSESSMENT — PAIN DESCRIPTION - ORIENTATION
ORIENTATION: RIGHT;LEFT
ORIENTATION: LEFT;RIGHT

## 2022-09-23 ASSESSMENT — PAIN - FUNCTIONAL ASSESSMENT: PAIN_FUNCTIONAL_ASSESSMENT: ACTIVITIES ARE NOT PREVENTED

## 2022-09-23 NOTE — FLOWSHEET NOTE
09/23/22 0855   Oxygen Therapy   SpO2 (!) 85 %   O2 Flow Rate (L/min) 4 L/min     MT called regarding low oxygen, Writer placed pt on 7L HFNC. Oxygen dropped 80%. MD at bedside. O2 increased to 15 L HFNC. New orders placed - see orders. MD placed HFNC cannula in pt mouth and oxygen increased to 98% on 15L HFNC. Writer called RT and RT/MD discussed NBM vs venturi. Venturi mask at bedside and per MD intermittently change between HFNC and venturi mask. Pt currently on 7L HFNC at 91%. RT will change HFNC to venturi once ABG collected. See RT notes. Pt remains stable, A&Ox4 and denies SOB.

## 2022-09-23 NOTE — PROGRESS NOTES
ONCOLOGY FOLLOW-UP:         PROBLEM LIST:       Patient Active Problem List   Diagnosis Code    Acute respiratory failure with hypoxia (HCC) J96.01    Hyperlipidemia E78.5    History of breast cancer Z85.3    Other pulmonary embolism without acute cor pulmonale (HCC) I26.99    Chronic anxiety F41.9    Myasthenia gravis ([facility] [address] G70.00    Moderate protein-calorie malnutrition ([facility] [address] E44.0       INTERVAL HISTORY:       Pt transferred to PCU. No fever. Remains on Lovenox. REVIEW OF SYSTEMS:       10 point ROS completed. Pertinent positives in HPI, otherwise negative.      PHYSICAL EXAM:       Vitals:    09/23/22 0231   BP: 108/68   Pulse: 88   Resp: 22   Temp: 97.6 °F (36.4 °C)   SpO2: 90%       General appearance: drowsy  Head: Normocephalic, without obvious abnormality, atraumatic  Neck: No palpable lymphadenopathy in supraclavicular or cervical chains  Lungs: Clear to auscultation bilaterally, no audible rales, wheezes or crackles  Heart: Regular rate and rhythm, S1, S2 normal  Abdomen: Soft, non-tender; bowel sounds normal; no masses,  no organomegaly  Extremities: without cyanosis, clubbing, edema or asymmetry  Skin: No jaundice, purpura or petechiae      LABS:     Lab Results   Component Value Date    WBC 4.4 09/23/2022    HGB 10.3 (L) 09/23/2022    HCT 31.6 (L) 09/23/2022    MCV 89.5 09/23/2022     09/23/2022       Lab Results   Component Value Date    GLUCOSE 100 (H) 09/23/2022    BUN 42 (H) 09/23/2022    CREATININE 1.2 09/23/2022    K 3.6 09/23/2022       Lab Results   Component Value Date    ALKPHOS 134 (H) 09/23/2022    ALT 19 09/23/2022    AST 43 (H) 09/23/2022    BILITOT 0.5 09/23/2022    PROT 5.1 (L) 09/23/2022     Lipase 142    CEA - 14.3     - pending     - pending    Ig levels -   Component Ref Range & Units 9/21/22 1833    IgA 70.0 - 400.0 mg/dL 59.0 Low     IgG 700.0 - 1600.0 mg/dL 463.0 Low     IgM 40.0 - 230.0 mg/dL 40.0        SPEP - pending    SFLC - pending    CA 19-9 - pending    CA 27.29 - pending    IMAGING:     XR CHEST PORTABLE  Result Date: 9/21/2022  Clear lungs. CT CHEST PULMONARY EMBOLISM W CONTRAST  Result Date: 9/21/2022  1. Multiple acute bilateral pulmonary emboli with evidence of right ventricular strain. 2. Disseminated osteolytic disease. The differential includes metastatic disease and myeloma. Acknowledgement of findings was confirmed by Elena Chawla at 10:22 am on 9/21/2022. Bone scan 9/22/22:  Abnormal uptake throughout the chest as described above. Some of the   abnormal uptake corresponds to rib and thoracic spine fractures which are   likely pathologic given the presence of multiple lytic lesions on CT. Increased uptake at L3 corresponds to compression fracture on CT which may be   acute/subacute. Constellation of findings could be due to metastatic disease   or multiple myeloma. CT A/P 9/22/22:  1. Healed bilateral lower rib fractures with subtle lytic lesions better seen   on recent chest CT. 2. Mild compression deformity along the inferior endplate of L3 may be acute   as there is increased uptake on concurrent bone scan. 3. No definite lytic lesions in the abdomen/pelvis although osteopenia limits   evaluation. 4. Extensive sigmoid diverticulosis without acute diverticulitis.      ASSESSMENT/PLAN:     Lytic Bone Lesions    - noted incidentally on CT scan  - remote history of breast cancer 30-40 years ago  - concern raised for metastatic disease vs myeloma  - no \"CRAB\" criteria present on labs - myeloma seems less likely   - checking SPEP, Ig levels, light chains - Ig levels not elevated  - checking tumor markers - CEA elevated at 14.3, other labs pending  - getting CT A/P shows no obvious primary  - bone scan shows bone lesions c/w metastatic disease vs. myeloma  - consider bone biopsy   - GI assessment may also be warranted depending on how aggressive family wishes to be  - pt appears quite weak and her PS is concerning regarding her ability to tolerate any aggressive therapy if she does have an advanced malignancy  - I discussed above results wit her daughter Lizzy Campbell - she wants to proceed with bone biopsy to establish diagnosis but is aware she realistically may not be a candidate for aggressive treatment  - order placed for bone biopsy but I am not sure she is stable enough yet (02 sats in 80s this AM) for procedure    2.   PE    - risk factor is presumed malignancy  - on Lovenox  - can continue Lovenox or switch to NOAC at discharge    Jb Rose MD

## 2022-09-23 NOTE — PROGRESS NOTES
Pulmonary Progress Note    CC: Acute PE, hypoxia    Subjective:  hypoxia noted earlier today. Currently on 5L O2      EXAM: BP (!) 99/59   Pulse 90   Temp 98.4 °F (36.9 °C) (Oral)   Resp 20   Ht 5' (1.524 m)   Wt 133 lb 5 oz (60.5 kg) Comment: without bedpad  LMP  (LMP Unknown) Comment: post menopausal  SpO2 94%   BMI 26.04 kg/m²  on 5L  Constitutional:  No acute distress. Eyes: PERRL. Conjunctivae anicteric. ENT: Normal nose. Normal tongue. Neck:  Trachea is midline. No thyroid tenderness. Respiratory: No accessory muscle usage. decreased breath sounds. No wheezes. No rales. No Rhonchi. Cardiovascular: Normal S1S2. No digit clubbing. No digit cyanosis. No LE edema. Psychiatric: No anxiety or Agitation. Alert and Oriented to person, place and time.     Scheduled Meds:   fluticasone  2 spray Each Nostril Daily    sodium chloride flush  5-40 mL IntraVENous 2 times per day    enoxaparin  1 mg/kg SubCUTAneous BID    atorvastatin  10 mg Oral Nightly    carvedilol  6.25 mg Oral BID WC    fenofibrate  160 mg Oral Daily    ferrous sulfate  325 mg Oral Daily with breakfast    cetirizine  5 mg Oral Daily    mycophenolate  1,000 mg Oral Nightly    mycophenolate  500 mg Oral QAM     Continuous Infusions:   sodium chloride       PRN Meds:  iohexol, diatrizoate meglumine-sodium, iopamidol, sodium chloride flush, sodium chloride, ondansetron **OR** ondansetron, polyethylene glycol, acetaminophen **OR** acetaminophen, potassium chloride **OR** potassium alternative oral replacement **OR** potassium chloride, magnesium sulfate, ipratropium-albuterol, ALPRAZolam, traMADol, docusate sodium, melatonin    Labs:  CBC:   Recent Labs     09/21/22  0610 09/22/22  0455 09/23/22  0501   WBC 6.5 4.8 4.4   HGB 11.0* 10.7* 10.3*   HCT 33.6* 31.8* 31.6*   MCV 88.3 88.8 89.5    197 185       BMP:   Recent Labs     09/21/22  0610 09/22/22  0455 09/23/22  0501   * 136 139   K 3.3* 3.6 3.6   CL 92* 94* 96*   CO2 27 28 29   BUN 39* 38* 42*   CREATININE 0.8 1.0 1.2     TTE: EF 55%, Grade 2 DD, RV enlarged and spap est 60    CTPA  Pulmonary Arteries: Pulmonary arteries are adequately opacified for   evaluation. There are multiple bilateral occlusive and partially occlusive   intraluminal filling defects within the distal main and bilateral segmental   pulmonary arterial vessels. The main pulmonary artery is normal in caliber. There is evidence of right ventricular strain with retrograde flow of   contrast into the intrahepatic venous system. Mediastinum: No evidence of mediastinal lymphadenopathy. The heart and   pericardium demonstrate no acute abnormality. There is no acute abnormality   of the thoracic aorta. There is a moderate-sized hiatal hernia. Lungs/pleura: The lungs are without acute process. No focal consolidation or   pulmonary edema. No evidence of pleural effusion or pneumothorax. Upper Abdomen: Limited images of the upper abdomen are unremarkable. Soft Tissues/Bones: There are multiple lytic lesions scattered throughout the   sternum and bilateral ribs with several remote healed pathologic fractures. Chronic severe multilevel wedge compression fractures are present within the   thoracic spine status post kyphoplasty. Impression   1. Multiple acute bilateral pulmonary emboli with evidence of right   ventricular strain. 2. Disseminated osteolytic disease. The differential includes metastatic   disease and myeloma.       CXR 9/23/22   Impression   Interval development of a small left pleural effusion with left basilar   airspace opacities which could represent atelectasis or pneumonia   -looks similar to prior cxr and equivocal  atelectasis vs trace effusion     ASSESSMENT:  Acute bilateral PEs possible secondary to malignancy  RV strain  Acute hypoxic respiratory failure   Small left pleural effusion   Osteolytic lesions  Back pain  Chronic anxiety on xanax  Abd pain  Myasthenia gravis on cellcept     PLAN:  Supplemental oxygen to maintain SaO2 >92%; wean as tolerated    TTE  Therapeutic lovenox - ok to switch to noac if no biopsy planned and will need life long therapy   Trial lasix 20mg IV x 1  Oncology following

## 2022-09-23 NOTE — PROGRESS NOTES
Bedside report and transfer of care given to MACK Burroughs. Pt currently resting in bed with the call light within reach. Pt denies any other care needs at this time. Pt stable at this time.

## 2022-09-23 NOTE — PROGRESS NOTES
Pt awake in bed. Assessment completed and medications given per MAR. VS as charted in flowsheet. A&Ox4. Purwick in place and hooked to suction. Pt denies any further needs at this time. Call light in reach and bed in lowest position.

## 2022-09-23 NOTE — CARE COORDINATION
INTERDISCIPLINARY PLAN OF CARE CONFERENCE    Date/Time: 9/23/2022 3:22 PM  Completed by: Adriel Prieto RN, Case Management      Patient Name:  Mhedi Wilkinson  YOB: 1945  Admitting Diagnosis: Acute respiratory failure ([facility] [address] [J96.00]  Acute on chronic respiratory failure ([facility] [address] [J96.20]     Admit Date/Time:  9/21/2022 12:11 AM    Chart reviewed. Interdisciplinary team contacted or reviewed plan related to patient progress and discharge plans. Disciplines included Case Management, Nursing, and Dietitian. Current Status: IP 09/21/2022  PT/OT recommendation for discharge plan of care: NA    Expected D/C Disposition:  TBD  Confirmed plan with patient, daughter Marguerite Car and son at bedside  Discharge Plan Comments: Chart reviewed. Met with pt and family at bedside and explained the role of the CM. Daughter (primary caregiver and decision maker)  Lives w/patient but works during the day. Other family members live too far to assist with care during the day. [facility] referral for [facility] assessment for LTC. Unsure if she will return to LTC but if so, prefers [facility]. CM will make referral when disposition more clear.    Home O2 in place on admit: No  now needing 93% 4 liters  Pt informed of need to bring portable home O2 tank on day of discharge for nursing to connect prior to leaving:  No  Verbalized agreement/Understanding:  Not Indicated

## 2022-09-23 NOTE — FLOWSHEET NOTE
09/23/22 0231   Vital Signs   Temp 97.6 °F (36.4 °C)   Temp Source Oral   Heart Rate 88   Heart Rate Source Monitor   Resp 22   /68   BP Location Right upper arm   BP Method Automatic   MAP (Calculated) 81.33   Patient Position Semi fowlers   Level of Consciousness 0   MEWS Score 2   Oxygen Therapy   SpO2 90 %   O2 Device Nasal cannula   O2 Flow Rate (L/min) 4 L/min   Height and Weight   Weight 133 lb 5 oz (60.5 kg)  (without bedpad)   Weight Method Bed scale   BMI (Calculated) 26.1     Pt awake in bed. VS as shown above. Pt states pain as a 8 out of 10 requesting tylenol. PRN tylenol given at this time. Call light in reach and bed in lowest position.

## 2022-09-23 NOTE — PROGRESS NOTES
Internal Medicine PCU Progress Note        79-year-old white female who is a poor historian admitted to hospital for shortness of breath. Work-up consistent with acute pulmonary embolism. She is hypoxic. Has complaints of back pain. She has a poor appetite. Work-up also showed metastatic bone lesions. Etiology of this is unclear. Remote history of breast cancer 30 to 40 years ago. Oncology seen the patient. Further diagnostic testing including bone scan and CT of the abdomen and pelvis ordered today. Family like to find source of metastatic bone lesions if possible. SUBJECTIVE. Pt hypoxia 80% this am - o2 via NC increased - still hypoxic. Pt noted to have nasal congestion and breathing through her mouth mostly. NC moved to the mouth and O2 sats responded to 97-98% promptly. Ordered ventury mask that can be intermittently used to help oxygenation. Added flonase to help nasal congestion. Pt is weak, but awake and alert and conversant. Invasive Lines: PIV        MV: N/A    Recent Labs     22  0955   PHART 7.460*   [id number] 40.7   PO2ART 82.5       MV Settings:     / / /FiO2 : 60 %    IV:   sodium chloride         Vitals:  Temp  Av.4 °F (36.9 °C)  Min: 97.6 °F (36.4 °C)  Max: 99.2 °F (37.3 °C)  Pulse  Av.4  Min: 87  Max: 100  BP  Min: 94/65  Max: 113/59  SpO2  Av.1 %  Min: 82 %  Max: 94 %  FiO2   Av %  Min: 60 %  Max: 60 %  Patient Vitals for the past 4 hrs:   SpO2   22 1038 94 %   22 1030 (!) 82 %   22 0959 94 %         CVP:        Intake/Output Summary (Last 24 hours) at 2022 1314  Last data filed at 2022 2043  Gross per 24 hour   Intake 10 ml   Output --   Net 10 ml         EXAM:  General: Awake and appears weak. Ill-appearing. Eyes: PERRL. No sclera icterus. No conjunctiva injected. ENT: No discharge. Pharynx clear. Neck: Trachea midline. Normal thyroid. Resp: No accessory muscle use. No crackles. No wheezing.  No rhonchi. No dullness on percussion. CV: Regular rate. Regular rhythm. No mumur or rub. No edema. No JVD. Palpable pedal pulses. GI: Non-tender. Non-distended. No masses. No organmegaly. Normal bowel sounds. No hernia. Skin: Warm and dry. No nodule on exposed extremities. No rash on exposed extremities. Lymph: No cervical LAD. No supraclavicular LAD. M/S: No cyanosis. No joint deformity. No clubbing. Neuro: Awake. Follows commands. Positive pupils/gag/corneals. Normal pain response. Psych: Oriented to person, place, time. No anxiety or agitation.      Medications:  Scheduled Meds:   fluticasone  2 spray Each Nostril Daily    sodium chloride flush  5-40 mL IntraVENous 2 times per day    enoxaparin  1 mg/kg SubCUTAneous BID    atorvastatin  10 mg Oral Nightly    carvedilol  6.25 mg Oral BID WC    fenofibrate  160 mg Oral Daily    ferrous sulfate  325 mg Oral Daily with breakfast    cetirizine  5 mg Oral Daily    mycophenolate  1,000 mg Oral Nightly    mycophenolate  500 mg Oral QAM       PRN Meds:  iohexol, diatrizoate meglumine-sodium, iopamidol, sodium chloride flush, sodium chloride, ondansetron **OR** ondansetron, polyethylene glycol, acetaminophen **OR** acetaminophen, potassium chloride **OR** potassium alternative oral replacement **OR** potassium chloride, magnesium sulfate, ipratropium-albuterol, ALPRAZolam, traMADol, docusate sodium, melatonin    Results:  CBC:   Recent Labs     09/21/22  0610 09/22/22  0455 09/23/22  0501   WBC 6.5 4.8 4.4   HGB 11.0* 10.7* 10.3*   HCT 33.6* 31.8* 31.6*   MCV 88.3 88.8 89.5    197 185       BMP:   Recent Labs     09/21/22  0610 09/22/22  0455 09/23/22  0501   * 136 139   K 3.3* 3.6 3.6   CL 92* 94* 96*   CO2 27 28 29   BUN 39* 38* 42*   CREATININE 0.8 1.0 1.2       LIVER PROFILE:   Recent Labs     09/21/22  0610 09/21/22  1833 09/22/22  0455 09/23/22  0501   AST 62*  --  46* 43*   ALT 28  --  23 19   LIPASE 175.0* 142.0*  --   --    BILITOT 0.6  -- 0.6 0.5   ALKPHOS 140*  --  137* 134*           Cultures:  COVID-19 negative    Films:    XR CHEST PORTABLE   Final Result   Interval development of a small left pleural effusion with left basilar   airspace opacities which could represent atelectasis or pneumonia         NM BONE SCAN WHOLE BODY   Final Result   Abnormal uptake throughout the chest as described above. Some of the   abnormal uptake corresponds to rib and thoracic spine fractures which are   likely pathologic given the presence of multiple lytic lesions on CT. Increased uptake at L3 corresponds to compression fracture on CT which may be   acute/subacute. Constellation of findings could be due to metastatic disease   or multiple myeloma. CT ABDOMEN PELVIS W IV CONTRAST Additional Contrast? Oral   Final Result   1. Healed bilateral lower rib fractures with subtle lytic lesions better seen   on recent chest CT. 2. Mild compression deformity along the inferior endplate of L3 may be acute   as there is increased uptake on concurrent bone scan. 3. No definite lytic lesions in the abdomen/pelvis although osteopenia limits   evaluation. 4. Extensive sigmoid diverticulosis without acute diverticulitis. CT CHEST PULMONARY EMBOLISM W CONTRAST   Final Result   1. Multiple acute bilateral pulmonary emboli with evidence of right   ventricular strain. 2. Disseminated osteolytic disease. The differential includes metastatic   disease and myeloma. Acknowledgement of findings was confirmed by Drew Brady at 10:22 am on   9/21/2022. XR CHEST PORTABLE   Final Result   Clear lungs.          CT BIOPSY DEEP BONE PERCUTANEOUS    (Results Pending)            Assessment:    Principal Problem:    Other pulmonary embolism without acute cor pulmonale (HCC)  Active Problems:    Acute respiratory failure with hypoxia (HCC)    Hyperlipidemia    History of breast cancer    Chronic anxiety    Myasthenia gravis (HCC)    Moderate protein-calorie malnutrition ([facility] [address]  Resolved Problems:    * No resolved hospital problems. *       Plan:    #Acute bilateral pulmonary embolism likely related to underlying malignancy. Started on Lovenox Tx      #Osteolytic bone lesions. Etiology unclear. Bone scan and CT abdomen and pelvis ordered by oncology. No primary evident at this time. MM work up so far unremarkable. #Myasthenia gravis on CellCept. #Acute Hypoxic respiratory failure due to pulmonary embolism on supplemental oxygen. #Chronic anxiety on Xanax. #Generalized weakness consult PT and OT. #She is a DNR CC.               Rafi To MD 1:14 PM 9/23/2022

## 2022-09-23 NOTE — PROGRESS NOTES
Pt has order placed for bone biopsy by Dr Yazmin Parks. Dr Clemente Weiss, IR Radiologist reviewed chart and feels pt not currently medically stable enough for the biopsy to be completed. He also has request for contact with Dr Yazmin Parks for clarification of biopsy requested, general or lesion. Dr Clemente Weiss wishes to wait and re evaluate pt condition Monday 9/26.

## 2022-09-24 LAB
ALBUMIN SERPL-MCNC: 2.6 G/DL (ref 3.4–5)
ANION GAP SERPL CALCULATED.3IONS-SCNC: 12 MMOL/L (ref 3–16)
BUN BLDV-MCNC: 43 MG/DL (ref 7–20)
CALCIUM SERPL-MCNC: 10 MG/DL (ref 8.3–10.6)
CHLORIDE BLD-SCNC: 93 MMOL/L (ref 99–110)
CO2: 29 MMOL/L (ref 21–32)
CREAT SERPL-MCNC: 1 MG/DL (ref 0.6–1.2)
GFR AFRICAN AMERICAN: >60
GFR NON-AFRICAN AMERICAN: 54
GLUCOSE BLD-MCNC: 76 MG/DL (ref 70–99)
PHOSPHORUS: 2.8 MG/DL (ref 2.5–4.9)
POTASSIUM SERPL-SCNC: 3.3 MMOL/L (ref 3.5–5.1)
SODIUM BLD-SCNC: 134 MMOL/L (ref 136–145)

## 2022-09-24 PROCEDURE — 36415 COLL VENOUS BLD VENIPUNCTURE: CPT

## 2022-09-24 PROCEDURE — 80069 RENAL FUNCTION PANEL: CPT

## 2022-09-24 PROCEDURE — 99233 SBSQ HOSP IP/OBS HIGH 50: CPT | Performed by: INTERNAL MEDICINE

## 2022-09-24 PROCEDURE — 6370000000 HC RX 637 (ALT 250 FOR IP): Performed by: INTERNAL MEDICINE

## 2022-09-24 PROCEDURE — 2580000003 HC RX 258: Performed by: INTERNAL MEDICINE

## 2022-09-24 PROCEDURE — 2700000000 HC OXYGEN THERAPY PER DAY

## 2022-09-24 PROCEDURE — 94761 N-INVAS EAR/PLS OXIMETRY MLT: CPT

## 2022-09-24 PROCEDURE — 6360000002 HC RX W HCPCS: Performed by: INTERNAL MEDICINE

## 2022-09-24 PROCEDURE — 2060000000 HC ICU INTERMEDIATE R&B

## 2022-09-24 RX ADMIN — MICONAZOLE NITRATE: 20 POWDER TOPICAL at 20:25

## 2022-09-24 RX ADMIN — ATORVASTATIN CALCIUM 10 MG: 10 TABLET, FILM COATED ORAL at 20:23

## 2022-09-24 RX ADMIN — POTASSIUM CHLORIDE 10 MEQ: 7.46 INJECTION, SOLUTION INTRAVENOUS at 23:40

## 2022-09-24 RX ADMIN — MYCOPHENOLATE MOFETIL 500 MG: 250 CAPSULE ORAL at 09:15

## 2022-09-24 RX ADMIN — ENOXAPARIN SODIUM 70 MG: 100 INJECTION SUBCUTANEOUS at 09:20

## 2022-09-24 RX ADMIN — MYCOPHENOLATE MOFETIL 1000 MG: 250 CAPSULE ORAL at 20:24

## 2022-09-24 RX ADMIN — ACETAMINOPHEN 650 MG: 325 TABLET ORAL at 14:28

## 2022-09-24 RX ADMIN — CARVEDILOL 6.25 MG: 6.25 TABLET, FILM COATED ORAL at 09:16

## 2022-09-24 RX ADMIN — FLUTICASONE PROPIONATE 2 SPRAY: 50 SPRAY, METERED NASAL at 09:24

## 2022-09-24 RX ADMIN — CETIRIZINE HYDROCHLORIDE 5 MG: 10 TABLET ORAL at 09:15

## 2022-09-24 RX ADMIN — ACETAMINOPHEN 650 MG: 325 TABLET ORAL at 20:24

## 2022-09-24 RX ADMIN — MICONAZOLE NITRATE: 20 POWDER TOPICAL at 09:23

## 2022-09-24 RX ADMIN — SODIUM CHLORIDE, PRESERVATIVE FREE 10 ML: 5 INJECTION INTRAVENOUS at 09:13

## 2022-09-24 RX ADMIN — ACETAMINOPHEN 650 MG: 325 TABLET ORAL at 09:12

## 2022-09-24 RX ADMIN — SODIUM CHLORIDE, PRESERVATIVE FREE 10 ML: 5 INJECTION INTRAVENOUS at 20:24

## 2022-09-24 RX ADMIN — CARVEDILOL 6.25 MG: 6.25 TABLET, FILM COATED ORAL at 17:00

## 2022-09-24 RX ADMIN — ENOXAPARIN SODIUM 70 MG: 100 INJECTION SUBCUTANEOUS at 20:23

## 2022-09-24 RX ADMIN — FERROUS SULFATE TAB 325 MG (65 MG ELEMENTAL FE) 325 MG: 325 (65 FE) TAB at 09:16

## 2022-09-24 RX ADMIN — FENOFIBRATE 160 MG: 160 TABLET ORAL at 09:16

## 2022-09-24 ASSESSMENT — PAIN SCALES - GENERAL
PAINLEVEL_OUTOF10: 10
PAINLEVEL_OUTOF10: 9
PAINLEVEL_OUTOF10: 10
PAINLEVEL_OUTOF10: 0

## 2022-09-24 ASSESSMENT — PAIN - FUNCTIONAL ASSESSMENT
PAIN_FUNCTIONAL_ASSESSMENT: PREVENTS OR INTERFERES SOME ACTIVE ACTIVITIES AND ADLS
PAIN_FUNCTIONAL_ASSESSMENT: PREVENTS OR INTERFERES SOME ACTIVE ACTIVITIES AND ADLS

## 2022-09-24 ASSESSMENT — PAIN DESCRIPTION - PAIN TYPE
TYPE: ACUTE PAIN
TYPE: ACUTE PAIN

## 2022-09-24 ASSESSMENT — PAIN DESCRIPTION - DESCRIPTORS
DESCRIPTORS: SHARP
DESCRIPTORS: SHARP

## 2022-09-24 ASSESSMENT — PAIN DESCRIPTION - ORIENTATION
ORIENTATION: RIGHT;LEFT
ORIENTATION: RIGHT;LEFT

## 2022-09-24 ASSESSMENT — PAIN DESCRIPTION - LOCATION
LOCATION: RIB CAGE
LOCATION: RIB CAGE

## 2022-09-24 NOTE — FLOWSHEET NOTE
09/24/22 1415   Vital Signs   Temp 97.6 °F (36.4 °C)   Temp Source Oral   Heart Rate 88   Heart Rate Source Monitor   Resp 17   /60   BP Location Right lower arm   MAP (Calculated) 85.67   Patient Position Semi fowlers   Level of Consciousness 0   MEWS Score 1   Pain Assessment   Pain Assessment 0-10   Pain Level 10   Patient's Stated Pain Goal 0 - No pain   Pain Location Rib cage   Pain Orientation Right;Left   Pain Descriptors Sharp   Functional Pain Assessment Prevents or interferes some active activities and ADLs   Pain Type Acute pain   Non-Pharmaceutical Pain Intervention(s) Declines   Oxygen Therapy   SpO2 92 %   O2 Device Nasal cannula   O2 Flow Rate (L/min) 4 L/min     Afternoon vitals completed. Pt c/o pain as described above. PRN tylenol given. Repositioned for comfort.     Radha Cazares RN

## 2022-09-24 NOTE — PROGRESS NOTES
Internal Medicine PCU Progress Note        70-year-old white female who is a poor historian admitted to hospital for shortness of breath. Work-up consistent with acute pulmonary embolism. She is hypoxic. Has complaints of back pain. She has a poor appetite. Work-up also showed metastatic bone lesions. Etiology of this is unclear. Remote history of breast cancer 30 to 40 years ago. Oncology seen the patient. Further diagnostic testing including bone scan and CT of the abdomen and pelvis ordered today. Family like to find source of metastatic bone lesions if possible. SUBJECTIVE. Oxygenation stable this am.     Feels very weak. Complains of a lot of pain - though would only take tylenol. Wants to proceed with biopsy      Invasive Lines: PIV        MV: N/A    Recent Labs     22  0955   PHART 7.460*   [id number] 40.7   PO2ART 82.5         MV Settings:     / / /FiO2 : 60 %    IV:   sodium chloride         Vitals:  Temp  Av.9 °F (36.6 °C)  Min: 97.6 °F (36.4 °C)  Max: 98.3 °F (36.8 °C)  Pulse  Av  Min: 83  Max: 92  BP  Min: 101/67  Max: 137/60  SpO2  Av.8 %  Min: 92 %  Max: 96 %  Patient Vitals for the past 4 hrs:   BP Temp Temp src Pulse Resp SpO2   22 1415 137/60 97.6 °F (36.4 °C) Oral 88 17 92 %         CVP:        Intake/Output Summary (Last 24 hours) at 2022 1555  Last data filed at 2022 1008  Gross per 24 hour   Intake 240 ml   Output 550 ml   Net -310 ml         EXAM:  General: Awake and appears weak. Ill-appearing. Eyes: PERRL. No sclera icterus. No conjunctiva injected. ENT: No discharge. Pharynx clear. Neck: Trachea midline. Normal thyroid. Resp: No accessory muscle use. No crackles. No wheezing. No rhonchi. No dullness on percussion. CV: Regular rate. Regular rhythm. No mumur or rub. No edema. No JVD. Palpable pedal pulses. GI: Non-tender. Non-distended. No masses. No organmegaly. Normal bowel sounds. No hernia. Skin: Warm and dry.  No nodule on exposed extremities. No rash on exposed extremities. Lymph: No cervical LAD. No supraclavicular LAD. M/S: No cyanosis. No joint deformity. No clubbing. Neuro: Awake. Follows commands. Positive pupils/gag/corneals. Normal pain response. Psych: Oriented to person, place, time. No anxiety or agitation.      Medications:  Scheduled Meds:   fluticasone  2 spray Each Nostril Daily    miconazole   Topical BID    sodium chloride flush  5-40 mL IntraVENous 2 times per day    enoxaparin  1 mg/kg SubCUTAneous BID    atorvastatin  10 mg Oral Nightly    carvedilol  6.25 mg Oral BID WC    fenofibrate  160 mg Oral Daily    ferrous sulfate  325 mg Oral Daily with breakfast    cetirizine  5 mg Oral Daily    mycophenolate  1,000 mg Oral Nightly    mycophenolate  500 mg Oral QAM       PRN Meds:  iohexol, diatrizoate meglumine-sodium, iopamidol, sodium chloride flush, sodium chloride, ondansetron **OR** ondansetron, polyethylene glycol, acetaminophen **OR** acetaminophen, potassium chloride **OR** potassium alternative oral replacement **OR** potassium chloride, magnesium sulfate, ipratropium-albuterol, ALPRAZolam, traMADol, docusate sodium, melatonin    Results:  CBC:   Recent Labs     09/22/22  0455 09/23/22  0501   WBC 4.8 4.4   HGB 10.7* 10.3*   HCT 31.8* 31.6*   MCV 88.8 89.5    185       BMP:   Recent Labs     09/22/22  0455 09/23/22  0501 09/24/22  0445    139 134*   K 3.6 3.6 3.3*   CL 94* 96* 93*   CO2 28 29 29   PHOS  --   --  2.8   BUN 38* 42* 43*   CREATININE 1.0 1.2 1.0       LIVER PROFILE:   Recent Labs     09/21/22  1833 09/22/22  0455 09/23/22  0501   AST  --  46* 43*   ALT  --  23 19   LIPASE 142.0*  --   --    BILITOT  --  0.6 0.5   ALKPHOS  --  137* 134*           Cultures:  COVID-19 negative    Films:    XR CHEST PORTABLE   Final Result   Interval development of a small left pleural effusion with left basilar   airspace opacities which could represent atelectasis or pneumonia         NM BONE SCAN WHOLE BODY   Final Result   Abnormal uptake throughout the chest as described above. Some of the   abnormal uptake corresponds to rib and thoracic spine fractures which are   likely pathologic given the presence of multiple lytic lesions on CT. Increased uptake at L3 corresponds to compression fracture on CT which may be   acute/subacute. Constellation of findings could be due to metastatic disease   or multiple myeloma. CT ABDOMEN PELVIS W IV CONTRAST Additional Contrast? Oral   Final Result   1. Healed bilateral lower rib fractures with subtle lytic lesions better seen   on recent chest CT. 2. Mild compression deformity along the inferior endplate of L3 may be acute   as there is increased uptake on concurrent bone scan. 3. No definite lytic lesions in the abdomen/pelvis although osteopenia limits   evaluation. 4. Extensive sigmoid diverticulosis without acute diverticulitis. CT CHEST PULMONARY EMBOLISM W CONTRAST   Final Result   1. Multiple acute bilateral pulmonary emboli with evidence of right   ventricular strain. 2. Disseminated osteolytic disease. The differential includes metastatic   disease and myeloma. Acknowledgement of findings was confirmed by Kaycee Campos at 10:22 am on   9/21/2022. XR CHEST PORTABLE   Final Result   Clear lungs. CT BIOPSY DEEP BONE PERCUTANEOUS    (Results Pending)            Assessment:    Principal Problem:    Other pulmonary embolism without acute cor pulmonale (HCC)  Active Problems:    Acute respiratory failure with hypoxia (HCC)    Hyperlipidemia    History of breast cancer    Chronic anxiety    Myasthenia gravis ([facility] [address]    Moderate protein-calorie malnutrition (HCC)  Resolved Problems:    * No resolved hospital problems. *       Plan:    #Acute bilateral pulmonary embolism likely related to underlying malignancy. Started on Lovenox Tx      #Osteolytic bone lesions. Etiology unclear.   Bone scan and CT abdomen and pelvis ordered by oncology. No primary evident at this time. MM work up so far unremarkable. Plan for bone biopsy - ordered - pending IR eval      #Myasthenia gravis on CellCept. #Acute Hypoxic respiratory failure due to pulmonary embolism on supplemental oxygen. #Chronic anxiety on Xanax. #Generalized weakness consult PT and OT. #She is a DNR CC.               José Miguel Shahid MD 3:55 PM 9/24/2022

## 2022-09-24 NOTE — FLOWSHEET NOTE
09/23/22 2100   Vital Signs   Temp 97.9 °F (36.6 °C)   Temp Source Oral   Heart Rate 87   Heart Rate Source Monitor   Resp 17   /68   BP Location Right upper arm   MAP (Calculated) 81.33   Patient Position Semi fowlers   Level of Consciousness 0   MEWS Score 1   Pain Assessment   Pain Assessment 0-10   Pain Level 10   Pain Location Rib cage   Pain Descriptors Sharp; Stabbing   Oxygen Therapy   SpO2 96 %   O2 Device Nasal cannula   O2 Flow Rate (L/min) 4 L/min   HS assessment completed, see flow sheet. Pt is alert and oriented. Vital signs are WNL. Respirations are even & easy. Complaints of pain voiced, prn tylenol given . Pt denies needs at this time. SR up x 2, and bed in low position. Call light is within reach. Bedside Mobility Assessment Tool (BMAT):     Assessment Level 1- Sit and Shake    1. From a semi-reclined position, ask patient to sit up and rotate to a seated position at the side of the bed. Can use the bedrail. 2. Ask patient to reach out and grab your hand and shake making sure patient reaches across his/her midline. Pass- Patient is able to come to a seated position, maintain core strength. Maintains seated balance while reaching across midline. Move on to Assessment Level 2. Assessment Level 2- Stretch and Point   1. With patient in seated position at the side of the bed, have patient place both feet on the floor (or stool) with knees no higher than hips. 2. Ask patient to stretch one leg and straighten the knee, then bend the ankle/flex and point the toes. If appropriate, repeat with the other leg. Fail- Patient is unable to complete task. Patient is MOBILITY LEVEL 2. Assessment Level 3- Stand   1. Ask patient to elevate off the bed or chair (seated to standing) using an assistive device (cane, bedrail). 2. Patient should be able to raise buttocks off be and hold for a count of five. May repeat once. Fail- Patient unable to demonstrate standing stability.  Patient is MOBILITY LEVEL 3. Assessment Level 4- Walk   1. Ask patient to march in place at bedside. 2. Then ask patient to advance step and return each foot. Some medical conditions may render a patient from stepping backwards, use your best clinical judgement. Fail- Patient not able to complete tasks OR requires use of assistive device. Patient is MOBILITY LEVEL 3. Mobility Level- 1    Patient is not able to demonstrate the ability to move from a reclining position to an upright position within the recliner due to weakness.

## 2022-09-24 NOTE — PLAN OF CARE
Conditions and Co-morbidities  Goal: Patient's chronic conditions and co-morbidity symptoms are monitored and maintained or improved  Outcome: Progressing     Problem: Anxiety  Goal: Will report anxiety at manageable levels  Description: INTERVENTIONS:  1. Administer medication as ordered  2. Teach and rehearse alternative coping skills  3. Provide emotional support with 1:1 interaction with staff  Outcome: Progressing     Problem: Coping  Goal: Pt/Family able to verbalize concerns and demonstrate effective coping strategies  Description: INTERVENTIONS:  1. Assist patient/family to identify coping skills, available support systems and cultural and spiritual values  2. Provide emotional support, including active listening and acknowledgement of concerns of patient and caregivers  3. Reduce environmental stimuli, as able  4. Instruct patient/family in relaxation techniques, as appropriate  5. Assess for spiritual pain/suffering and initiate Spiritual Care, Psychosocial Clinical Specialist consults as needed  Outcome: Progressing     Problem: Decision Making  Goal: Pt/Family able to effectively weigh alternatives and participate in decision making related to treatment and care  Description: INTERVENTIONS:  1. Determine when there are differences between patient's view, family's view, and healthcare provider's view of condition  2. Facilitate patient and family articulation of goals for care  3. Help patient and family identify pros/cons of alternative solutions  4. Provide information as requested by patient/family  5. Respect patient/family right to receive or not to receive information  6. Serve as a liaison between patient and family and health care team  7.  Initiate Consults from Ethics, Palliative Care or initiate [address] [place] [address] as is appropriate  Outcome: Progressing     Problem: Behavior  Goal: Pt/Family maintain appropriate behavior and adhere to behavioral management agreement, if implemented  Description: INTERVENTIONS:  1. Assess patient/family's coping skills and  non-compliant behavior (including use of illegal substances)  2. Notify security of behavior or suspected illegal substances which indicate the need for search of the family and/or belongings  3. Encourage verbalization of thoughts and concerns in a socially appropriate manner  4. Utilize positive, consistent limit setting strategies supporting safety of patient, staff and others  5. Encourage participation in the decision making process about the behavioral management agreement  6. If a visitor's behavior poses a threat to safety call refer to organization policy.   7. Initiate consult with , Psychosocial CNS, Spiritual Care as appropriate  Outcome: Progressing     Problem: Nutrition Deficit:  Goal: Optimize nutritional status  Outcome: Progressing

## 2022-09-24 NOTE — PROGRESS NOTES
Pulmonary Progress Note    CC: Acute PE, hypoxia    Subjective:  c/o back pain and unable to get out of bed. Still has AMAYA. EXAM: /76   Pulse 92   Temp 98.3 °F (36.8 °C) (Oral)   Resp 18   Ht 5' (1.524 m)   Wt 134 lb 8 oz (61 kg)   LMP  (LMP Unknown) Comment: post menopausal  SpO2 95%   BMI 26.27 kg/m²  on 4L  Constitutional:  No acute distress. Eyes: PERRL. Conjunctivae anicteric. ENT: Normal nose. Normal tongue. Neck:  Trachea is midline. No thyroid tenderness. Respiratory: No accessory muscle usage. decreased breath sounds. No wheezes. No rales. No Rhonchi. Cardiovascular: Normal S1S2. No digit clubbing. No digit cyanosis. No LE edema. Psychiatric: No anxiety or Agitation. Alert and Oriented to person, place and time.     Scheduled Meds:   fluticasone  2 spray Each Nostril Daily    miconazole   Topical BID    sodium chloride flush  5-40 mL IntraVENous 2 times per day    enoxaparin  1 mg/kg SubCUTAneous BID    atorvastatin  10 mg Oral Nightly    carvedilol  6.25 mg Oral BID WC    fenofibrate  160 mg Oral Daily    ferrous sulfate  325 mg Oral Daily with breakfast    cetirizine  5 mg Oral Daily    mycophenolate  1,000 mg Oral Nightly    mycophenolate  500 mg Oral QAM     Continuous Infusions:   sodium chloride       PRN Meds:  iohexol, diatrizoate meglumine-sodium, iopamidol, sodium chloride flush, sodium chloride, ondansetron **OR** ondansetron, polyethylene glycol, acetaminophen **OR** acetaminophen, potassium chloride **OR** potassium alternative oral replacement **OR** potassium chloride, magnesium sulfate, ipratropium-albuterol, ALPRAZolam, traMADol, docusate sodium, melatonin    Labs:  CBC:   Recent Labs     09/22/22  0455 09/23/22  0501   WBC 4.8 4.4   HGB 10.7* 10.3*   HCT 31.8* 31.6*   MCV 88.8 89.5    185       BMP:   Recent Labs     09/22/22  0455 09/23/22  0501 09/24/22  0445    139 134*   K 3.6 3.6 3.3*   CL 94* 96* 93*   CO2 28 29 29   PHOS  --   --  2.8   BUN 45* 42* 37*   CREATININE 1.0 1.2 1.0     TTE: EF 55%, Grade 2 DD, RV enlarged and spap est 60    CTPA  Pulmonary Arteries: Pulmonary arteries are adequately opacified for   evaluation. There are multiple bilateral occlusive and partially occlusive   intraluminal filling defects within the distal main and bilateral segmental   pulmonary arterial vessels. The main pulmonary artery is normal in caliber. There is evidence of right ventricular strain with retrograde flow of   contrast into the intrahepatic venous system. Mediastinum: No evidence of mediastinal lymphadenopathy. The heart and   pericardium demonstrate no acute abnormality. There is no acute abnormality   of the thoracic aorta. There is a moderate-sized hiatal hernia. Lungs/pleura: The lungs are without acute process. No focal consolidation or   pulmonary edema. No evidence of pleural effusion or pneumothorax. Upper Abdomen: Limited images of the upper abdomen are unremarkable. Soft Tissues/Bones: There are multiple lytic lesions scattered throughout the   sternum and bilateral ribs with several remote healed pathologic fractures. Chronic severe multilevel wedge compression fractures are present within the   thoracic spine status post kyphoplasty. Impression   1. Multiple acute bilateral pulmonary emboli with evidence of right   ventricular strain. 2. Disseminated osteolytic disease. The differential includes metastatic   disease and myeloma.       CXR 9/23/22   Impression   Interval development of a small left pleural effusion with left basilar   airspace opacities which could represent atelectasis or pneumonia   -looks similar to prior cxr and equivocal  atelectasis vs trace effusion     ASSESSMENT:  Acute bilateral PEs possible secondary to malignancy  RV strain  Acute hypoxic respiratory failure   Small left pleural effusion   Osteolytic lesions  Back pain  Chronic anxiety on xanax  Abd pain  Myasthenia gravis

## 2022-09-25 LAB
ANION GAP SERPL CALCULATED.3IONS-SCNC: 10 MMOL/L (ref 3–16)
BUN BLDV-MCNC: 39 MG/DL (ref 7–20)
CA 19-9: 11 U/ML (ref 0–35)
CA 27-29: 2678 U/ML (ref 0–38)
CALCIUM SERPL-MCNC: 9.9 MG/DL (ref 8.3–10.6)
CHLORIDE BLD-SCNC: 94 MMOL/L (ref 99–110)
CO2: 30 MMOL/L (ref 21–32)
CREAT SERPL-MCNC: 0.9 MG/DL (ref 0.6–1.2)
GFR AFRICAN AMERICAN: >60
GFR NON-AFRICAN AMERICAN: >60
GLUCOSE BLD-MCNC: 93 MG/DL (ref 70–99)
MAGNESIUM: 1.9 MG/DL (ref 1.8–2.4)
POTASSIUM REFLEX MAGNESIUM: 3.4 MMOL/L (ref 3.5–5.1)
SODIUM BLD-SCNC: 134 MMOL/L (ref 136–145)

## 2022-09-25 PROCEDURE — 6360000002 HC RX W HCPCS: Performed by: INTERNAL MEDICINE

## 2022-09-25 PROCEDURE — 80048 BASIC METABOLIC PNL TOTAL CA: CPT

## 2022-09-25 PROCEDURE — 2580000003 HC RX 258: Performed by: INTERNAL MEDICINE

## 2022-09-25 PROCEDURE — 94761 N-INVAS EAR/PLS OXIMETRY MLT: CPT

## 2022-09-25 PROCEDURE — 99233 SBSQ HOSP IP/OBS HIGH 50: CPT | Performed by: INTERNAL MEDICINE

## 2022-09-25 PROCEDURE — 2060000000 HC ICU INTERMEDIATE R&B

## 2022-09-25 PROCEDURE — 6370000000 HC RX 637 (ALT 250 FOR IP): Performed by: INTERNAL MEDICINE

## 2022-09-25 PROCEDURE — 2700000000 HC OXYGEN THERAPY PER DAY

## 2022-09-25 PROCEDURE — 83735 ASSAY OF MAGNESIUM: CPT

## 2022-09-25 PROCEDURE — 36415 COLL VENOUS BLD VENIPUNCTURE: CPT

## 2022-09-25 RX ORDER — LOPERAMIDE HYDROCHLORIDE 2 MG/1
2 CAPSULE ORAL 4 TIMES DAILY PRN
Status: DISCONTINUED | OUTPATIENT
Start: 2022-09-25 | End: 2022-09-27 | Stop reason: HOSPADM

## 2022-09-25 RX ADMIN — POTASSIUM BICARBONATE 40 MEQ: 391 TABLET, EFFERVESCENT ORAL at 11:00

## 2022-09-25 RX ADMIN — FLUTICASONE PROPIONATE 2 SPRAY: 50 SPRAY, METERED NASAL at 11:02

## 2022-09-25 RX ADMIN — ENOXAPARIN SODIUM 70 MG: 100 INJECTION SUBCUTANEOUS at 21:05

## 2022-09-25 RX ADMIN — SODIUM CHLORIDE, PRESERVATIVE FREE 10 ML: 5 INJECTION INTRAVENOUS at 21:06

## 2022-09-25 RX ADMIN — ENOXAPARIN SODIUM 70 MG: 100 INJECTION SUBCUTANEOUS at 11:03

## 2022-09-25 RX ADMIN — ATORVASTATIN CALCIUM 10 MG: 10 TABLET, FILM COATED ORAL at 21:05

## 2022-09-25 RX ADMIN — CARVEDILOL 6.25 MG: 6.25 TABLET, FILM COATED ORAL at 11:00

## 2022-09-25 RX ADMIN — CETIRIZINE HYDROCHLORIDE 5 MG: 10 TABLET ORAL at 11:00

## 2022-09-25 RX ADMIN — MYCOPHENOLATE MOFETIL 500 MG: 250 CAPSULE ORAL at 11:00

## 2022-09-25 RX ADMIN — ACETAMINOPHEN 650 MG: 325 TABLET ORAL at 10:57

## 2022-09-25 RX ADMIN — SODIUM CHLORIDE, PRESERVATIVE FREE 10 ML: 5 INJECTION INTRAVENOUS at 11:04

## 2022-09-25 RX ADMIN — LOPERAMIDE HYDROCHLORIDE 2 MG: 2 CAPSULE ORAL at 14:41

## 2022-09-25 RX ADMIN — FERROUS SULFATE TAB 325 MG (65 MG ELEMENTAL FE) 325 MG: 325 (65 FE) TAB at 11:00

## 2022-09-25 RX ADMIN — ONDANSETRON HYDROCHLORIDE 4 MG: 2 INJECTION, SOLUTION INTRAMUSCULAR; INTRAVENOUS at 12:55

## 2022-09-25 RX ADMIN — FENOFIBRATE 160 MG: 160 TABLET ORAL at 11:00

## 2022-09-25 RX ADMIN — MYCOPHENOLATE MOFETIL 1000 MG: 250 CAPSULE ORAL at 21:05

## 2022-09-25 RX ADMIN — MICONAZOLE NITRATE: 20 POWDER TOPICAL at 21:06

## 2022-09-25 RX ADMIN — ACETAMINOPHEN 650 MG: 325 TABLET ORAL at 17:53

## 2022-09-25 RX ADMIN — MICONAZOLE NITRATE: 20 POWDER TOPICAL at 11:02

## 2022-09-25 ASSESSMENT — PAIN SCALES - GENERAL
PAINLEVEL_OUTOF10: 0
PAINLEVEL_OUTOF10: 10
PAINLEVEL_OUTOF10: 8

## 2022-09-25 ASSESSMENT — PAIN SCALES - WONG BAKER
WONGBAKER_NUMERICALRESPONSE: 0

## 2022-09-25 ASSESSMENT — PAIN DESCRIPTION - PAIN TYPE: TYPE: ACUTE PAIN

## 2022-09-25 ASSESSMENT — PAIN DESCRIPTION - LOCATION
LOCATION: BACK;RIB CAGE
LOCATION: RIB CAGE;BACK

## 2022-09-25 ASSESSMENT — PAIN DESCRIPTION - ORIENTATION
ORIENTATION: RIGHT;LEFT
ORIENTATION: RIGHT;LEFT

## 2022-09-25 ASSESSMENT — PAIN DESCRIPTION - DESCRIPTORS: DESCRIPTORS: ACHING;DISCOMFORT

## 2022-09-25 NOTE — FLOWSHEET NOTE
09/24/22 2015   Vital Signs   Temp 97.4 °F (36.3 °C)   Temp Source Oral   Heart Rate 84   Heart Rate Source Monitor   Resp 18   /76   BP Location Right upper arm   MAP (Calculated) 87.67   Patient Position Semi fowlers   Level of Consciousness 0   MEWS Score 1   Pain Assessment   Pain Assessment 0-10   Pain Level 9   Oxygen Therapy   SpO2 96 %   O2 Device Nasal cannula   O2 Flow Rate (L/min) 4 L/min   HS assessment completed, see flow sheet. Pt is alert and oriented. Vital signs are WNL. Respirations are even & easy. Pt is very anxious, prn medications somewhat effective. Reminded patient  Pt denies needs at this time. SR up x 2, and bed in low position. Call light is within reach. Bedside Mobility Assessment Tool (BMAT):     Assessment Level 1- Sit and Shake    1. From a semi-reclined position, ask patient to sit up and rotate to a seated position at the side of the bed. Can use the bedrail. 2. Ask patient to reach out and grab your hand and shake making sure patient reaches across his/her midline. Pass- Patient is able to come to a seated position, maintain core strength. Maintains seated balance while reaching across midline. Move on to Assessment Level 2. Assessment Level 2- Stretch and Point   1. With patient in seated position at the side of the bed, have patient place both feet on the floor (or stool) with knees no higher than hips. 2. Ask patient to stretch one leg and straighten the knee, then bend the ankle/flex and point the toes. If appropriate, repeat with the other leg. Fail- Patient is unable to complete task. Patient is MOBILITY LEVEL 2. Assessment Level 3- Stand   1. Ask patient to elevate off the bed or chair (seated to standing) using an assistive device (cane, bedrail). 2. Patient should be able to raise buttocks off be and hold for a count of five. May repeat once. Fail- Patient unable to demonstrate standing stability. Patient is MOBILITY LEVEL 3. Assessment Level 4- Walk   1. Ask patient to march in place at bedside. 2. Then ask patient to advance step and return each foot. Some medical conditions may render a patient from stepping backwards, use your best clinical judgement. Fail- Patient not able to complete tasks OR requires use of assistive device. Patient is MOBILITY LEVEL 3. Mobility Level- 1    Patient is not able to demonstrate the ability to move from a reclining position to an upright position within the recliner due to weakness.

## 2022-09-25 NOTE — PROGRESS NOTES
Pulmonary Progress Note    CC: Acute PE, hypoxia    Subjective:  still has back pain and SOB    EXAM: BP 98/66   Pulse 94   Temp 97.6 °F (36.4 °C) (Oral)   Resp 18   Ht 5' (1.524 m)   Wt 134 lb 8 oz (61 kg)   LMP  (LMP Unknown) Comment: post menopausal  SpO2 91%   BMI 26.27 kg/m²  on 4L  Constitutional:  No acute distress. Eyes: PERRL. Conjunctivae anicteric. ENT: Normal nose. Normal tongue. Neck:  Trachea is midline. No thyroid tenderness. Respiratory: No accessory muscle usage. decreased breath sounds. No wheezes. No rales. No Rhonchi. Cardiovascular: Normal S1S2. No digit clubbing. No digit cyanosis. No LE edema. Psychiatric: No anxiety or Agitation. Alert and Oriented to person, place and time.     Scheduled Meds:   fluticasone  2 spray Each Nostril Daily    miconazole   Topical BID    sodium chloride flush  5-40 mL IntraVENous 2 times per day    enoxaparin  1 mg/kg SubCUTAneous BID    atorvastatin  10 mg Oral Nightly    carvedilol  6.25 mg Oral BID WC    fenofibrate  160 mg Oral Daily    ferrous sulfate  325 mg Oral Daily with breakfast    cetirizine  5 mg Oral Daily    mycophenolate  1,000 mg Oral Nightly    mycophenolate  500 mg Oral QAM     Continuous Infusions:   sodium chloride       PRN Meds:  iohexol, diatrizoate meglumine-sodium, iopamidol, sodium chloride flush, sodium chloride, ondansetron **OR** ondansetron, polyethylene glycol, acetaminophen **OR** acetaminophen, potassium chloride **OR** potassium alternative oral replacement **OR** potassium chloride, magnesium sulfate, ipratropium-albuterol, ALPRAZolam, traMADol, docusate sodium, melatonin    Labs:  CBC:   Recent Labs     09/23/22  0501   WBC 4.4   HGB 10.3*   HCT 31.6*   MCV 89.5          BMP:   Recent Labs     09/23/22  0501 09/24/22  0445 09/25/22  0422    134* 134*   K 3.6 3.3* 3.4*   CL 96* 93* 94*   CO2 29 29 30   PHOS  --  2.8  --    BUN 42* 43* 39*   CREATININE 1.2 1.0 0.9     TTE: EF 55%, Grade 2 DD, RV enlarged and spap est 60    CTPA  Pulmonary Arteries: Pulmonary arteries are adequately opacified for   evaluation. There are multiple bilateral occlusive and partially occlusive   intraluminal filling defects within the distal main and bilateral segmental   pulmonary arterial vessels. The main pulmonary artery is normal in caliber. There is evidence of right ventricular strain with retrograde flow of   contrast into the intrahepatic venous system. Mediastinum: No evidence of mediastinal lymphadenopathy. The heart and   pericardium demonstrate no acute abnormality. There is no acute abnormality   of the thoracic aorta. There is a moderate-sized hiatal hernia. Lungs/pleura: The lungs are without acute process. No focal consolidation or   pulmonary edema. No evidence of pleural effusion or pneumothorax. Upper Abdomen: Limited images of the upper abdomen are unremarkable. Soft Tissues/Bones: There are multiple lytic lesions scattered throughout the   sternum and bilateral ribs with several remote healed pathologic fractures. Chronic severe multilevel wedge compression fractures are present within the   thoracic spine status post kyphoplasty. Impression   1. Multiple acute bilateral pulmonary emboli with evidence of right   ventricular strain. 2. Disseminated osteolytic disease. The differential includes metastatic   disease and myeloma.       CXR 9/23/22   Impression   Interval development of a small left pleural effusion with left basilar   airspace opacities which could represent atelectasis or pneumonia   -looks similar to prior cxr and equivocal  atelectasis vs trace effusion     ASSESSMENT:  Acute bilateral PEs possible secondary to malignancy  RV strain  Acute hypoxic respiratory failure   Small left pleural effusion   Osteolytic lesions  Back pain  Chronic anxiety on xanax  Abd pain  Myasthenia gravis on cellcept     PLAN:  Supplemental oxygen to maintain SaO2 >92%;

## 2022-09-25 NOTE — PROGRESS NOTES
Bedside report and transfer of care given to [facility] [place]. Pt currently resting in bed with the call light within reach. Pt denies any other care needs at this time. Pt stable at this time.       Obdulio Griffith RN

## 2022-09-25 NOTE — PROGRESS NOTES
Bedside report and transfer of care given to [facility]. Pt currently resting in bed with the call light within reach. Pt denies any other care needs at this time. Pt stable at this time.       Nathan Vance RN

## 2022-09-25 NOTE — PROGRESS NOTES
Internal Medicine PCU Progress Note        76-year-old white female who is a poor historian admitted to hospital for shortness of breath. Work-up consistent with acute pulmonary embolism. She is hypoxic. Has complaints of back pain. She has a poor appetite. Work-up also showed metastatic bone lesions. Etiology of this is unclear. Remote history of breast cancer 30 to 40 years ago. Oncology seen the patient. Further diagnostic testing including bone scan and CT of the abdomen and pelvis ordered today. Family like to find source of metastatic bone lesions if possible. SUBJECTIVE. Oxygenation better this am.     Feels very weak. Complains of a lot of pain - though would only take anything but tylenol. Wants to proceed with biopsy - tentative plan for tomorrow. CA 27/29 cancer antigen returned today and markedly elevated - this may be suggestive of recurrent breast Ca with heavy tumor burden. Invasive Lines: PIV        MV: N/A    Recent Labs     22  0955   PHART 7.460*   QRP6QLS 40.7   PO2ART 82.5         MV Settings:     / / /FiO2 : 60 %    IV:   sodium chloride         Vitals:  Temp  Av.6 °F (36.4 °C)  Min: 97.4 °F (36.3 °C)  Max: 97.8 °F (36.6 °C)  Pulse  Av.7  Min: 84  Max: 94  BP  Min: 98/66  Max: 137/60  SpO2  Av %  Min: 91 %  Max: 96 %  Patient Vitals for the past 4 hrs:   BP Temp Temp src Pulse Resp SpO2   22 1030 125/80 97.8 °F (36.6 °C) Oral 94 17 92 %         CVP:        Intake/Output Summary (Last 24 hours) at 2022 1350  Last data filed at 2022 1030  Gross per 24 hour   Intake 440.17 ml   Output 600 ml   Net -159.83 ml         EXAM:  General: Awake and appears weak. Ill-appearing. Eyes: PERRL. No sclera icterus. No conjunctiva injected. ENT: No discharge. Pharynx clear. Neck: Trachea midline. Normal thyroid. Resp: No accessory muscle use. No crackles. No wheezing. No rhonchi. No dullness on percussion. CV: Regular rate. Regular rhythm. No mumur or rub. No edema. No JVD. Palpable pedal pulses. GI: Non-tender. Non-distended. No masses. No organmegaly. Normal bowel sounds. No hernia. Skin: Warm and dry. No nodule on exposed extremities. No rash on exposed extremities. Lymph: No cervical LAD. No supraclavicular LAD. M/S: No cyanosis. No joint deformity. No clubbing. Neuro: Awake. Follows commands. Positive pupils/gag/corneals. Normal pain response. Psych: Oriented to person, place, time. No anxiety or agitation.      Medications:  Scheduled Meds:   fluticasone  2 spray Each Nostril Daily    miconazole   Topical BID    sodium chloride flush  5-40 mL IntraVENous 2 times per day    enoxaparin  1 mg/kg SubCUTAneous BID    atorvastatin  10 mg Oral Nightly    carvedilol  6.25 mg Oral BID WC    fenofibrate  160 mg Oral Daily    ferrous sulfate  325 mg Oral Daily with breakfast    cetirizine  5 mg Oral Daily    mycophenolate  1,000 mg Oral Nightly    mycophenolate  500 mg Oral QAM       PRN Meds:  iohexol, diatrizoate meglumine-sodium, iopamidol, sodium chloride flush, sodium chloride, ondansetron **OR** ondansetron, polyethylene glycol, acetaminophen **OR** acetaminophen, potassium chloride **OR** potassium alternative oral replacement **OR** potassium chloride, magnesium sulfate, ipratropium-albuterol, ALPRAZolam, traMADol, docusate sodium, melatonin    Results:  CBC:   Recent Labs     09/23/22  0501   WBC 4.4   HGB 10.3*   HCT 31.6*   MCV 89.5          BMP:   Recent Labs     09/23/22  0501 09/24/22  0445 09/25/22  0422    134* 134*   K 3.6 3.3* 3.4*   CL 96* 93* 94*   CO2 29 29 30   PHOS  --  2.8  --    BUN 42* 43* 39*   CREATININE 1.2 1.0 0.9       LIVER PROFILE:   Recent Labs     09/23/22  0501   AST 43*   ALT 19   BILITOT 0.5   ALKPHOS 134*           Cultures:  COVID-19 negative    Films:    XR CHEST PORTABLE   Final Result   Interval development of a small left pleural effusion with left basilar   airspace opacities which could represent atelectasis or pneumonia         NM BONE SCAN WHOLE BODY   Final Result   Abnormal uptake throughout the chest as described above. Some of the   abnormal uptake corresponds to rib and thoracic spine fractures which are   likely pathologic given the presence of multiple lytic lesions on CT. Increased uptake at L3 corresponds to compression fracture on CT which may be   acute/subacute. Constellation of findings could be due to metastatic disease   or multiple myeloma. CT ABDOMEN PELVIS W IV CONTRAST Additional Contrast? Oral   Final Result   1. Healed bilateral lower rib fractures with subtle lytic lesions better seen   on recent chest CT. 2. Mild compression deformity along the inferior endplate of L3 may be acute   as there is increased uptake on concurrent bone scan. 3. No definite lytic lesions in the abdomen/pelvis although osteopenia limits   evaluation. 4. Extensive sigmoid diverticulosis without acute diverticulitis. CT CHEST PULMONARY EMBOLISM W CONTRAST   Final Result   1. Multiple acute bilateral pulmonary emboli with evidence of right   ventricular strain. 2. Disseminated osteolytic disease. The differential includes metastatic   disease and myeloma. Acknowledgement of findings was confirmed by Xochitl Baez at 10:22 am on   9/21/2022. XR CHEST PORTABLE   Final Result   Clear lungs. CT BIOPSY DEEP BONE PERCUTANEOUS    (Results Pending)            Assessment:    Principal Problem:    Other pulmonary embolism without acute cor pulmonale (HCC)  Active Problems:    Acute respiratory failure with hypoxia (HCC)    Hyperlipidemia    History of breast cancer    Chronic anxiety    Myasthenia gravis ([address]    Moderate protein-calorie malnutrition (HCC)  Resolved Problems:    * No resolved hospital problems. *       Plan:    #Acute bilateral pulmonary embolism likely related to underlying malignancy.   Started on Lovenox Tx      #Osteolytic bone lesions. Etiology unclear. Bone scan and CT abdomen and pelvis ordered by oncology. No primary evident at this time. MM work up so far unremarkable. Remote hx of breast Ca. CA 27/29 markedly elevated at 2678 - this may be an indication of recurrent breast Ca with heavy tumor burden. Plan for bone biopsy - ordered - pending IR eval        #Myasthenia gravis on CellCept. #Acute Hypoxic respiratory failure due to pulmonary embolism on supplemental oxygen. #Chronic anxiety on Xanax. #Generalized weakness consult PT and OT. #She is a DNR CC.               Sakina Sparrow MD 1:50 PM 9/25/2022

## 2022-09-25 NOTE — PLAN OF CARE
Problem: Anxiety  Goal: Will report anxiety at manageable levels  Description: INTERVENTIONS:  1. Administer medication as ordered  2. Teach and rehearse alternative coping skills  3. Provide emotional support with 1:1 interaction with staff  Outcome: Not Progressing     Problem: Behavior  Goal: Pt/Family maintain appropriate behavior and adhere to behavioral management agreement, if implemented  Description: INTERVENTIONS:  1. Assess patient/family's coping skills and  non-compliant behavior (including use of illegal substances)  2. Notify security of behavior or suspected illegal substances which indicate the need for search of the family and/or belongings  3. Encourage verbalization of thoughts and concerns in a socially appropriate manner  4. Utilize positive, consistent limit setting strategies supporting safety of patient, staff and others  5. Encourage participation in the decision making process about the behavioral management agreement  6. If a visitor's behavior poses a threat to safety call refer to organization policy. 7. Initiate consult with , Psychosocial CNS, Spiritual Care as appropriate  Outcome: Not Progressing     Problem: Anxiety  Goal: Will report anxiety at manageable levels  Description: INTERVENTIONS:  1. Administer medication as ordered  2. Teach and rehearse alternative coping skills  3. Provide emotional support with 1:1 interaction with staff  Outcome: Not Progressing     Problem: Behavior  Goal: Pt/Family maintain appropriate behavior and adhere to behavioral management agreement, if implemented  Description: INTERVENTIONS:  1. Assess patient/family's coping skills and  non-compliant behavior (including use of illegal substances)  2. Notify security of behavior or suspected illegal substances which indicate the need for search of the family and/or belongings  3. Encourage verbalization of thoughts and concerns in a socially appropriate manner  4.  Utilize positive, consistent limit setting strategies supporting safety of patient, staff and others  5. Encourage participation in the decision making process about the behavioral management agreement  6. If a visitor's behavior poses a threat to safety call refer to organization policy.   7. Initiate consult with , Psychosocial CNS, Spiritual Care as appropriate  Outcome: Not Progressing

## 2022-09-25 NOTE — PROGRESS NOTES
Attempted to replace patients potassium via PO route, pt did not tolerate PO potassium . MD made aware, new order to replace potassium via IV . MD advised to replace potassium with 4 bags of 10 mEq/100ml , for a total of 40 mEq.

## 2022-09-25 NOTE — FLOWSHEET NOTE
09/25/22 5573   Pain Assessment   Pain Level 8   Patient's Stated Pain Goal 0 - No pain   Pain Location Rib cage;Back   Pain Orientation Right;Left   Pain Descriptors Aching;Discomfort   Functional Pain Assessment Prevents or interferes some active activities and ADLs   Non-Pharmaceutical Pain Intervention(s) Declines     Pt C/O pain as described above; PRN tylenol given. Pt stable. Mepilex to heels changed.       Anay Blackburn RN

## 2022-09-25 NOTE — PROGRESS NOTES
Transferred care to [facility], bedside report given the patient resting in bed with eyes closed, no complaints voiced, call light within reach .

## 2022-09-25 NOTE — FLOWSHEET NOTE
09/25/22 1515   Vital Signs   Temp 98.4 °F (36.9 °C)   Temp Source Oral   Heart Rate 94   Heart Rate Source Monitor   Resp 17   /63   BP Location Right upper arm   MAP (Calculated) 75.33   Patient Position Semi fowlers   Level of Consciousness 0   MEWS Score 2   Pain Assessment   Pain Assessment 0-10   Pain Level 0   Oxygen Therapy   SpO2 96 %   O2 Device Venturi-mask   O2 Flow Rate (L/min) 4 L/min       Afternoon vitals completed. CMU notified this RN of Pt desaturation while sleeping. Attempted to bump Pt from 4L to 5L on the NC. Not effective. Placed on venturi mask. Vitals as shown above. Pt resting and stable. Family at bedside; updated on care plan.     Clint Sparrow RN

## 2022-09-25 NOTE — FLOWSHEET NOTE
09/25/22 1030   Vital Signs   Temp 97.8 °F (36.6 °C)   Temp Source Oral   Heart Rate 94   Heart Rate Source Monitor   Resp 17   /80   MAP (Calculated) 95   Pain Assessment   Pain Assessment 0-10   Pain Level 10   Pain Location Back;Rib cage   Pain Orientation Right;Left   Functional Pain Assessment Prevents or interferes some active activities and ADLs   Pain Type Acute pain   Non-Pharmaceutical Pain Intervention(s) Declines   Oxygen Therapy   SpO2 92 %   O2 Device Nasal cannula   O2 Flow Rate (L/min) 4 L/min       Pt assessment complete; see flow sheets. Vitals completed. Meds given per Mar. Sacrum wound cleansed and mepilex changed. Repositioned for comfort. Pt denies any other care needs at this time. Pt stable.     Sherry Montero RN

## 2022-09-26 PROBLEM — J96.01 ACUTE HYPOXEMIC RESPIRATORY FAILURE (HCC): Status: ACTIVE | Noted: 2022-09-26

## 2022-09-26 PROBLEM — I26.99 BILATERAL PULMONARY EMBOLISM (HCC): Status: ACTIVE | Noted: 2022-09-26

## 2022-09-26 PROBLEM — J90 PLEURAL EFFUSION: Status: ACTIVE | Noted: 2022-09-26

## 2022-09-26 LAB
ANION GAP SERPL CALCULATED.3IONS-SCNC: 12 MMOL/L (ref 3–16)
BUN BLDV-MCNC: 37 MG/DL (ref 7–20)
CALCIUM SERPL-MCNC: 10.4 MG/DL (ref 8.3–10.6)
CHLORIDE BLD-SCNC: 97 MMOL/L (ref 99–110)
CO2: 28 MMOL/L (ref 21–32)
CREAT SERPL-MCNC: 0.7 MG/DL (ref 0.6–1.2)
GFR AFRICAN AMERICAN: >60
GFR NON-AFRICAN AMERICAN: >60
GLUCOSE BLD-MCNC: 83 MG/DL (ref 70–99)
INR BLD: 1.13 (ref 0.87–1.14)
POTASSIUM REFLEX MAGNESIUM: 3.7 MMOL/L (ref 3.5–5.1)
PROTHROMBIN TIME: 14.3 SEC (ref 11.7–14.5)
SODIUM BLD-SCNC: 137 MMOL/L (ref 136–145)

## 2022-09-26 PROCEDURE — 6370000000 HC RX 637 (ALT 250 FOR IP): Performed by: INTERNAL MEDICINE

## 2022-09-26 PROCEDURE — 36415 COLL VENOUS BLD VENIPUNCTURE: CPT

## 2022-09-26 PROCEDURE — 6360000002 HC RX W HCPCS: Performed by: INTERNAL MEDICINE

## 2022-09-26 PROCEDURE — 2580000003 HC RX 258: Performed by: INTERNAL MEDICINE

## 2022-09-26 PROCEDURE — 99232 SBSQ HOSP IP/OBS MODERATE 35: CPT | Performed by: INTERNAL MEDICINE

## 2022-09-26 PROCEDURE — 85610 PROTHROMBIN TIME: CPT

## 2022-09-26 PROCEDURE — 2700000000 HC OXYGEN THERAPY PER DAY

## 2022-09-26 PROCEDURE — 2060000000 HC ICU INTERMEDIATE R&B

## 2022-09-26 PROCEDURE — 94761 N-INVAS EAR/PLS OXIMETRY MLT: CPT

## 2022-09-26 PROCEDURE — 80048 BASIC METABOLIC PNL TOTAL CA: CPT

## 2022-09-26 RX ADMIN — MICONAZOLE NITRATE: 20 POWDER TOPICAL at 08:57

## 2022-09-26 RX ADMIN — CETIRIZINE HYDROCHLORIDE 5 MG: 10 TABLET ORAL at 08:42

## 2022-09-26 RX ADMIN — FENOFIBRATE 160 MG: 160 TABLET ORAL at 08:41

## 2022-09-26 RX ADMIN — MYCOPHENOLATE MOFETIL 500 MG: 250 CAPSULE ORAL at 08:41

## 2022-09-26 RX ADMIN — FLUTICASONE PROPIONATE 2 SPRAY: 50 SPRAY, METERED NASAL at 09:03

## 2022-09-26 RX ADMIN — MYCOPHENOLATE MOFETIL 1000 MG: 250 CAPSULE ORAL at 21:55

## 2022-09-26 RX ADMIN — ATORVASTATIN CALCIUM 10 MG: 10 TABLET, FILM COATED ORAL at 21:54

## 2022-09-26 RX ADMIN — SODIUM CHLORIDE, PRESERVATIVE FREE 10 ML: 5 INJECTION INTRAVENOUS at 08:42

## 2022-09-26 RX ADMIN — FERROUS SULFATE TAB 325 MG (65 MG ELEMENTAL FE) 325 MG: 325 (65 FE) TAB at 09:17

## 2022-09-26 RX ADMIN — SODIUM CHLORIDE, PRESERVATIVE FREE 10 ML: 5 INJECTION INTRAVENOUS at 21:55

## 2022-09-26 RX ADMIN — MICONAZOLE NITRATE: 20 POWDER TOPICAL at 21:54

## 2022-09-26 RX ADMIN — ENOXAPARIN SODIUM 70 MG: 100 INJECTION SUBCUTANEOUS at 21:54

## 2022-09-26 RX ADMIN — ENOXAPARIN SODIUM 70 MG: 100 INJECTION SUBCUTANEOUS at 08:40

## 2022-09-26 RX ADMIN — ACETAMINOPHEN 650 MG: 325 TABLET ORAL at 15:35

## 2022-09-26 RX ADMIN — ACETAMINOPHEN 650 MG: 325 TABLET ORAL at 08:41

## 2022-09-26 ASSESSMENT — PAIN SCALES - WONG BAKER
WONGBAKER_NUMERICALRESPONSE: 0

## 2022-09-26 ASSESSMENT — PAIN DESCRIPTION - ORIENTATION
ORIENTATION: LEFT
ORIENTATION: RIGHT;LEFT

## 2022-09-26 ASSESSMENT — PAIN SCALES - GENERAL
PAINLEVEL_OUTOF10: 10
PAINLEVEL_OUTOF10: 8
PAINLEVEL_OUTOF10: 10

## 2022-09-26 ASSESSMENT — PAIN DESCRIPTION - DESCRIPTORS
DESCRIPTORS: BURNING;SHARP
DESCRIPTORS: ACHING;STABBING

## 2022-09-26 ASSESSMENT — PAIN DESCRIPTION - LOCATION
LOCATION: BACK;RIB CAGE
LOCATION: RIB CAGE

## 2022-09-26 ASSESSMENT — PAIN - FUNCTIONAL ASSESSMENT: PAIN_FUNCTIONAL_ASSESSMENT: ACTIVITIES ARE NOT PREVENTED

## 2022-09-26 NOTE — PROGRESS NOTES
Pt. Signing consent for bone biopsy. However, when going over addendum to see if wants to supsend her DRN-CC for bone biopsy procedure. Pt. Stated \" the more I thought about it more I want to have a chance at revival and want every thing done if something should happen. \" Dr. Thai Goldsmith called and informed and pt. Changed from [facility] to Full Code.

## 2022-09-26 NOTE — PROGRESS NOTES
Pulmonary Progress Note    CC: Acute PE, hypoxia    Subjective:    Appears comfortable  4 L O2-no home O2    EXAM: /80   Pulse 95   Temp 97.7 °F (36.5 °C) (Oral)   Resp 18   Ht 5' (1.524 m)   Wt 134 lb 8 oz (61 kg)   LMP  (LMP Unknown) Comment: post menopausal  SpO2 95%   BMI 26.27 kg/m²  on 4L  Constitutional:  No acute distress. Eyes: PERRL. Conjunctivae anicteric. ENT: Normal nose. Normal tongue. Neck:  Trachea is midline. No thyroid tenderness. Respiratory: No accessory muscle usage. Decreased breath sounds. No wheezes. No rales. No Rhonchi. Cardiovascular: Normal S1S2. No digit clubbing. No digit cyanosis. No LE edema. Psychiatric: No anxiety or Agitation. Alert and Oriented to person, place and time. Scheduled Meds:   fluticasone  2 spray Each Nostril Daily    miconazole   Topical BID    sodium chloride flush  5-40 mL IntraVENous 2 times per day    enoxaparin  1 mg/kg SubCUTAneous BID    atorvastatin  10 mg Oral Nightly    carvedilol  6.25 mg Oral BID WC    fenofibrate  160 mg Oral Daily    ferrous sulfate  325 mg Oral Daily with breakfast    cetirizine  5 mg Oral Daily    mycophenolate  1,000 mg Oral Nightly    mycophenolate  500 mg Oral QAM     Continuous Infusions:   sodium chloride       PRN Meds:  loperamide, iohexol, diatrizoate meglumine-sodium, iopamidol, sodium chloride flush, sodium chloride, ondansetron **OR** ondansetron, polyethylene glycol, acetaminophen **OR** acetaminophen, potassium chloride **OR** potassium alternative oral replacement **OR** potassium chloride, magnesium sulfate, ipratropium-albuterol, ALPRAZolam, traMADol, docusate sodium, melatonin    Labs:  CBC:   No results for input(s): WBC, HGB, HCT, MCV, PLT in the last 72 hours.   BMP:   Recent Labs     09/24/22 0445 09/25/22 0422 09/26/22 0421   * 134* 137   K 3.3* 3.4* 3.7   CL 93* 94* 97*   CO2 29 30 28   PHOS 2.8  --   --    BUN 43* 39* 37*   CREATININE 1.0 0.9 0.7     TTE: EF 55%, Grade 2 DD, RV enlarged and spap est 60    CTPA  Pulmonary Arteries: Pulmonary arteries are adequately opacified for   evaluation. There are multiple bilateral occlusive and partially occlusive   intraluminal filling defects within the distal main and bilateral segmental   pulmonary arterial vessels. The main pulmonary artery is normal in caliber. There is evidence of right ventricular strain with retrograde flow of   contrast into the intrahepatic venous system. Mediastinum: No evidence of mediastinal lymphadenopathy. The heart and   pericardium demonstrate no acute abnormality. There is no acute abnormality   of the thoracic aorta. There is a moderate-sized hiatal hernia. Lungs/pleura: The lungs are without acute process. No focal consolidation or   pulmonary edema. No evidence of pleural effusion or pneumothorax. Upper Abdomen: Limited images of the upper abdomen are unremarkable. Soft Tissues/Bones: There are multiple lytic lesions scattered throughout the   sternum and bilateral ribs with several remote healed pathologic fractures. Chronic severe multilevel wedge compression fractures are present within the   thoracic spine status post kyphoplasty. Impression   1. Multiple acute bilateral pulmonary emboli with evidence of right   ventricular strain. 2. Disseminated osteolytic disease. The differential includes metastatic   disease and myeloma.       CXR 9/23/22   Impression   Interval development of a small left pleural effusion with left basilar   airspace opacities which could represent atelectasis or pneumonia   -looks similar to prior cxr and equivocal  atelectasis vs trace effusion     ASSESSMENT:  Acute hypoxemic respiratory failure  Bilateral PEs possible secondary to malignancy with RV strain  Small left pleural effusion   Lytic bone lesions  Chronic anxiety on xanax  Myasthenia gravis on cellcept     PLAN:  Supplemental oxygen to maintain SaO2 >92%; wean as tolerated Bone bx planned - cleared for procedure from a pulmonary perspective  Therapeutic lovenox - ok to switch to noac after biopsy    Oncology following

## 2022-09-26 NOTE — PROGRESS NOTES
Comprehensive Nutrition Assessment    Type and Reason for Visit:  Reassess    Nutrition Recommendations/Plan:   Continue NPO status until patient is medically cleared to receive nutrition therapy. Monitor for diet advancement + appetite and po intake when diet is advanced. Please obtain an updated weight for this patient - last weight was obtained on 9/24/22. Monitor nutrition-related labs, bowel function, and weight trends. Malnutrition Assessment:  Malnutrition Status:   Moderate malnutrition (09/26/22 1330)    Context:  Chronic Illness     Findings of the 6 clinical characteristics of malnutrition:  Energy Intake:  75% or less estimated energy requirements for 1 month or longer  Weight Loss:  Mild weight loss (- 9# or 6.1% weight loss since 9/21/22)     Body Fat Loss:  Mild body fat loss Orbital, Triceps   Muscle Mass Loss:  Mild muscle mass loss Temples (temporalis), Clavicles (pectoralis & deltoids), Calf (gastrocnemius), Hand (interosseous)  Fluid Accumulation:  No significant fluid accumulation     Strength:  Not Performed    Nutrition Assessment:    patient is declining from a nutritional standpoint AEB NPO status at this time and poor po intake prior to being NPO and she is at risk for further compromise d/t inadequate po nutrition intake for majority of this admission, wounds, and concern for metastatic disease vs myeloma; will continue NPO status until patient is medically cleared to receive nutrition therapy    Nutrition Related Findings:    patient is A & O x 4; patient's po intake has been < 50% of meals for majority of this admission; patient is scheduled to have a bone biopsy this afternoon; + BMs on 9/25/22; patient consumed 1-25% x 1 meal on 9/25/22; abdomen is soft, round, and bowel sounds are active Wound Type: Multiple (wounds on R/L buttocks and wounds on R/L heels)       Current Nutrition Intake & Therapies:    Average Meal Intake: 1-25%  Average Supplements Intake: Unable to Too soon to determine     Hans Dubin, [address] [place] [address], LD  Contact: [phone]

## 2022-09-26 NOTE — PLAN OF CARE
Problem: Discharge Planning  Goal: Discharge to home or other facility with appropriate resources  9/26/2022 1005 by Maritza Norris RN  Outcome: Progressing  9/26/2022 0930 by Maritza Norris RN  Outcome: Progressing     Problem: Pain  Goal: Verbalizes/displays adequate comfort level or baseline comfort level  9/26/2022 1005 by Maritza Norris RN  Outcome: Progressing  9/26/2022 0930 by Maritza Norris RN  Outcome: Progressing     Problem: Respiratory - Adult  Goal: Achieves optimal ventilation and oxygenation  9/26/2022 1005 by Maritza Norris RN  Outcome: Progressing  9/26/2022 0930 by Maritza Norris RN  Outcome: Progressing     Problem: Cardiovascular - Adult  Goal: Maintains optimal cardiac output and hemodynamic stability  9/26/2022 1005 by Maritza Norris RN  Outcome: Progressing  9/26/2022 0930 by Maritza Norris RN  Outcome: Progressing     Problem: Skin/Tissue Integrity - Adult  Goal: Skin integrity remains intact  9/26/2022 1005 by Maritza Norris RN  Outcome: Progressing  9/26/2022 0930 by Maritza Norris RN  Outcome: Progressing  Goal: Incisions, wounds, or drain sites healing without S/S of infection  9/26/2022 1005 by Maritza Norris RN  Outcome: Progressing  9/26/2022 0930 by Maritza Norris RN  Outcome: Progressing     Problem: Musculoskeletal - Adult  Goal: Return mobility to safest level of function  9/26/2022 1005 by Maritza Norris RN  Outcome: Progressing  9/26/2022 0930 by Maritza Norris RN  Outcome: Progressing     Problem: Gastrointestinal - Adult  Goal: Maintains or returns to baseline bowel function  9/26/2022 1005 by Maritza Norris RN  Outcome: Progressing  9/26/2022 0930 by Maritza Norris RN  Outcome: Progressing     Problem: Genitourinary - Adult  Goal: Absence of urinary retention  9/26/2022 1005 by Maritza Norris RN  Outcome: Progressing  9/26/2022 0930 by Maritza Norris RN  Outcome: Progressing     Problem: Infection - Adult  Goal: Absence of infection at discharge  9/26/2022 1005 by Clare Gunderson RN  Outcome: Progressing  9/26/2022 0930 by Clare Gunderson RN  Outcome: Progressing     Problem: Metabolic/Fluid and Electrolytes - Adult  Goal: Electrolytes maintained within normal limits  9/26/2022 1005 by Clare Gunderson RN  Outcome: Progressing  9/26/2022 0930 by Clare Gunderson RN  Outcome: Progressing     Problem: Safety - Adult  Goal: Free from fall injury  9/26/2022 1005 by Clare Gunderson RN  Outcome: Progressing  9/26/2022 0930 by Clare Gunderson RN  Outcome: Progressing     Problem: ABCDS Injury Assessment  Goal: Absence of physical injury  9/26/2022 1005 by Clare Gunderson RN  Outcome: Progressing  9/26/2022 0930 by Clare Gunderson RN  Outcome: Progressing     Problem: Skin/Tissue Integrity  Goal: Absence of new skin breakdown  Description: 1. Monitor for areas of redness and/or skin breakdown  2. Assess vascular access sites hourly  3. Every 4-6 hours minimum:  Change oxygen saturation probe site  4. Every 4-6 hours:  If on nasal continuous positive airway pressure, respiratory therapy assess nares and determine need for appliance change or resting period. 9/26/2022 1005 by Clare Gunderson RN  Outcome: Progressing  9/26/2022 0930 by Clare Gunderson RN  Outcome: Progressing     Problem: Chronic Conditions and Co-morbidities  Goal: Patient's chronic conditions and co-morbidity symptoms are monitored and maintained or improved  9/26/2022 1005 by Clare Gunderson RN  Outcome: Progressing  9/26/2022 0930 by Clare Gunderson RN  Outcome: Progressing     Problem: Anxiety  Goal: Will report anxiety at manageable levels  Description: INTERVENTIONS:  1. Administer medication as ordered  2. Teach and rehearse alternative coping skills  3.  Provide emotional support with 1:1 interaction with staff  9/26/2022 1005 by Clare Gunderson RN  Outcome: Progressing  9/26/2022 0930 by Esther Sandoval Lisa Car RN  Outcome: Progressing     Problem: Coping  Goal: Pt/Family able to verbalize concerns and demonstrate effective coping strategies  Description: INTERVENTIONS:  1. Assist patient/family to identify coping skills, available support systems and cultural and spiritual values  2. Provide emotional support, including active listening and acknowledgement of concerns of patient and caregivers  3. Reduce environmental stimuli, as able  4. Instruct patient/family in relaxation techniques, as appropriate  5. Assess for spiritual pain/suffering and initiate Spiritual Care, Psychosocial Clinical Specialist consults as needed  9/26/2022 1005 by Arben Montilla RN  Outcome: Progressing  9/26/2022 0930 by Arben Montilla RN  Outcome: Progressing     Problem: Decision Making  Goal: Pt/Family able to effectively weigh alternatives and participate in decision making related to treatment and care  Description: INTERVENTIONS:  1. Determine when there are differences between patient's view, family's view, and healthcare provider's view of condition  2. Facilitate patient and family articulation of goals for care  3. Help patient and family identify pros/cons of alternative solutions  4. Provide information as requested by patient/family  5. Respect patient/family right to receive or not to receive information  6. Serve as a liaison between patient and family and health care team  7. Initiate Consults from Ethics, Palliative Care or initiate [facility] as is appropriate  9/26/2022 1005 by Arben Montilla RN  Outcome: Progressing  9/26/2022 0930 by Arben Montilla RN  Outcome: Progressing     Problem: Behavior  Goal: Pt/Family maintain appropriate behavior and adhere to behavioral management agreement, if implemented  Description: INTERVENTIONS:  1. Assess patient/family's coping skills and  non-compliant behavior (including use of illegal substances)  2.  Notify security of behavior or suspected illegal substances which indicate the need for search of the family and/or belongings  3. Encourage verbalization of thoughts and concerns in a socially appropriate manner  4. Utilize positive, consistent limit setting strategies supporting safety of patient, staff and others  5. Encourage participation in the decision making process about the behavioral management agreement  6. If a visitor's behavior poses a threat to safety call refer to organization policy.   7. Initiate consult with , Psychosocial CNS, Spiritual Care as appropriate  9/26/2022 1005 by Finn Bonilla RN  Outcome: Progressing  9/26/2022 0930 by Finn Bonilla RN  Outcome: Progressing     Problem: Nutrition Deficit:  Goal: Optimize nutritional status  9/26/2022 1005 by Finn Bonilla RN  Outcome: Progressing  9/26/2022 0930 by Finn Bonilla RN  Outcome: Progressing

## 2022-09-26 NOTE — PROGRESS NOTES
Pt awake in bed. Assessment completed and medications given per MAR. A&Ox4. VS as charted in flowsheet. Pt denies any further needs at this time. Call light in reach and bed in lowest position. DISPLAY PLAN FREE TEXT

## 2022-09-26 NOTE — FLOWSHEET NOTE
09/26/22 0233   Vital Signs   Temp 97.7 °F (36.5 °C)   Temp Source Oral   Heart Rate 95   Heart Rate Source Monitor   Resp 18   /80   BP Location Right upper arm   MAP (Calculated) 93.67   Level of Consciousness 0   MEWS Score 1   Oxygen Therapy   SpO2 95 %   Pulse Oximeter Device Mode Intermittent   Pulse Oximeter Device Location Finger   O2 Device Nasal cannula   O2 Flow Rate (L/min) 4 L/min     Pt awake in bed. VS as shown above. Pt denies any further needs at this time. Call light in reach and bed in lowest position.

## 2022-09-26 NOTE — PROGRESS NOTES
Internal Medicine PCU Progress Note        76-year-old white female who is a poor historian admitted to hospital for shortness of breath. Work-up consistent with acute pulmonary embolism. She is hypoxic. Has complaints of back pain. She has a poor appetite. Work-up also showed metastatic bone lesions. Etiology of this is unclear. Remote history of breast cancer 30 to 40 years ago. Oncology seen the patient. Further diagnostic testing including bone scan and CT of the abdomen and pelvis ordered today. Family like to find source of metastatic bone lesions if possible. SUBJECTIVE  Patient is awake alert and oriented and in no distress. No shortness of breath today . Oxygen saturation stable on 4 L. .    CODE STATUS was addressed with patient again today and she wants to be a full code now. .  .  Plan is for bone biopsy this afternoon . Oncology following. .  She is weak PT OT consulted. Continued on Lovenox. .          CA 27/29 cancer antigen  markedly elevated - this may be suggestive of recurrent breast Ca with heavy tumor burden. IV:   sodium chloride         Vitals:  Temp  Av °F (36.7 °C)  Min: 97.5 °F (36.4 °C)  Max: 98.4 °F (36.9 °C)  Pulse  Av.2  Min: 94  Max: 97  BP  Min: 98/67  Max: 121/80  SpO2  Av.8 %  Min: 94 %  Max: 96 %  Patient Vitals for the past 4 hrs:   BP   22 0917 98/67       CVP:      No intake or output data in the 24 hours ending 22 1204    EXAM:  General: Awake and appears weak. Ill-appearing. Eyes: PERRL. No sclera icterus. No conjunctiva injected. ENT: No discharge. Pharynx clear. Neck: Trachea midline. Normal thyroid. Resp: No accessory muscle use. No crackles. No wheezing. No rhonchi. No dullness on percussion. CV: Regular rate. Regular rhythm. No mumur or rub. No edema. No JVD. Palpable pedal pulses. GI: Non-tender. Non-distended. No masses. No organmegaly. Normal bowel sounds. No hernia. Skin: Warm and dry.  No nodule the   abnormal uptake corresponds to rib and thoracic spine fractures which are   likely pathologic given the presence of multiple lytic lesions on CT. Increased uptake at L3 corresponds to compression fracture on CT which may be   acute/subacute. Constellation of findings could be due to metastatic disease   or multiple myeloma. CT ABDOMEN PELVIS W IV CONTRAST Additional Contrast? Oral   Final Result   1. Healed bilateral lower rib fractures with subtle lytic lesions better seen   on recent chest CT. 2. Mild compression deformity along the inferior endplate of L3 may be acute   as there is increased uptake on concurrent bone scan. 3. No definite lytic lesions in the abdomen/pelvis although osteopenia limits   evaluation. 4. Extensive sigmoid diverticulosis without acute diverticulitis. CT CHEST PULMONARY EMBOLISM W CONTRAST   Final Result   1. Multiple acute bilateral pulmonary emboli with evidence of right   ventricular strain. 2. Disseminated osteolytic disease. The differential includes metastatic   disease and myeloma. Acknowledgement of findings was confirmed by Esteban Leigh at 10:22 am on   9/21/2022. XR CHEST PORTABLE   Final Result   Clear lungs. CT BIOPSY DEEP BONE PERCUTANEOUS    (Results Pending)            Assessment:    Principal Problem:    Other pulmonary embolism without acute cor pulmonale (HCC)  Active Problems:    Acute respiratory failure with hypoxia (HCC)    Hyperlipidemia    History of breast cancer    Chronic anxiety    Myasthenia gravis ([facility] [address]    Moderate protein-calorie malnutrition (HCC)  Resolved Problems:    * No resolved hospital problems. *       Plan:    #Acute bilateral pulmonary embolism likely related to underlying malignancy. Started on Lovenox Tx    #Osteolytic bone lesions. Etiology unclear. Bone scan and CT abdomen and pelvis ordered by oncology. No primary evident at this time. MM work up so far unremarkable.    Remote hx of breast Ca. CA 27/29 markedly elevated at 2678 - this may be an indication of recurrent breast Ca with heavy tumor burden. Plan for bone biopsy - ordered - pending IR edith    #Myasthenia gravis on CellCept. #Acute Hypoxic respiratory failure due to pulmonary embolism on supplemental oxygen. 4 L O2. Wean as tolerated. #Chronic anxiety on Xanax. #Generalized weakness consult PT and OT. #Moderate PCM    #She is a DNR CC--> Full Code now    DVT Prophylaxis: Full dose Lovenox  Diet: NPO    Stefanie Chávez MD          Addendum   bone biopsy canceled today   radiologist reviewed imaging studies and he does not think these are lytic bone lesions,  he thinks is a bone fractures.    Oncology to reeval in Francesca Kirkpatrick MD

## 2022-09-26 NOTE — CARE COORDINATION
INTERDISCIPLINARY PLAN OF CARE CONFERENCE    Date/Time: 9/26/2022 3:21 PM  Completed by: Samira Salazar RN, Case Management      Patient Name:  Nati Blackburn  YOB: 1945  Admitting Diagnosis: Acute respiratory failure ([facility] [address] [J96.00]  Acute on chronic respiratory failure ([facility] [address] [J96.20]     Admit Date/Time:  9/21/2022 12:11 AM    Chart reviewed. Interdisciplinary team contacted or reviewed plan related to patient progress and discharge plans. Disciplines included Case Management, Nursing, and Dietitian. Current Status:IP 09/21/2022  PT/OT recommendation for discharge plan of care: Ordered. Bone BX PENDING    Expected D/C Disposition:  Skilled nursing facility    Discharge Plan Comments: Chart reviewed. Met with pt's daughter/caregiver. Plan: TBD. FC referral made last week for [facility] application. Tentative plan for bone BX tomorrow. Pt wishes to be placed in a facility closer to her home and prefers [facility]. Referral faxed to [facility] for review. CM following.

## 2022-09-26 NOTE — PLAN OF CARE
Problem: Discharge Planning  Goal: Discharge to home or other facility with appropriate resources  Outcome: Progressing     Problem: Pain  Goal: Verbalizes/displays adequate comfort level or baseline comfort level  Outcome: Progressing     Problem: Neurosensory - Adult  Goal: Achieves stable or improved neurological status  Outcome: Progressing     Problem: Respiratory - Adult  Goal: Achieves optimal ventilation and oxygenation  Outcome: Progressing     Problem: Cardiovascular - Adult  Goal: Maintains optimal cardiac output and hemodynamic stability  Outcome: Progressing     Problem: Skin/Tissue Integrity - Adult  Goal: Skin integrity remains intact  Outcome: Progressing  Goal: Incisions, wounds, or drain sites healing without S/S of infection  Outcome: Progressing     Problem: Musculoskeletal - Adult  Goal: Return mobility to safest level of function  Outcome: Progressing     Problem: Gastrointestinal - Adult  Goal: Maintains or returns to baseline bowel function  Outcome: Progressing     Problem: Genitourinary - Adult  Goal: Absence of urinary retention  Outcome: Progressing     Problem: Infection - Adult  Goal: Absence of infection at discharge  Outcome: Progressing     Problem: Metabolic/Fluid and Electrolytes - Adult  Goal: Electrolytes maintained within normal limits  Outcome: Progressing     Problem: ABCDS Injury Assessment  Goal: Absence of physical injury  Outcome: Progressing     Problem: Skin/Tissue Integrity  Goal: Absence of new skin breakdown  Description: 1. Monitor for areas of redness and/or skin breakdown  2. Assess vascular access sites hourly  3. Every 4-6 hours minimum:  Change oxygen saturation probe site  4. Every 4-6 hours:  If on nasal continuous positive airway pressure, respiratory therapy assess nares and determine need for appliance change or resting period.   Outcome: Progressing     Problem: Chronic Conditions and Co-morbidities  Goal: Patient's chronic conditions and co-morbidity symptoms are monitored and maintained or improved  Outcome: Progressing     Problem: Anxiety  Goal: Will report anxiety at manageable levels  Description: INTERVENTIONS:  1. Administer medication as ordered  2. Teach and rehearse alternative coping skills  3. Provide emotional support with 1:1 interaction with staff  Outcome: Progressing     Problem: Coping  Goal: Pt/Family able to verbalize concerns and demonstrate effective coping strategies  Description: INTERVENTIONS:  1. Assist patient/family to identify coping skills, available support systems and cultural and spiritual values  2. Provide emotional support, including active listening and acknowledgement of concerns of patient and caregivers  3. Reduce environmental stimuli, as able  4. Instruct patient/family in relaxation techniques, as appropriate  5. Assess for spiritual pain/suffering and initiate Spiritual Care, Psychosocial Clinical Specialist consults as needed  Outcome: Progressing     Problem: Decision Making  Goal: Pt/Family able to effectively weigh alternatives and participate in decision making related to treatment and care  Description: INTERVENTIONS:  1. Determine when there are differences between patient's view, family's view, and healthcare provider's view of condition  2. Facilitate patient and family articulation of goals for care  3. Help patient and family identify pros/cons of alternative solutions  4. Provide information as requested by patient/family  5. Respect patient/family right to receive or not to receive information  6. Serve as a liaison between patient and family and health care team  7. Initiate Consults from Ethics, Palliative Care or initiate [address] [place] [address] as is appropriate  Outcome: Progressing     Problem: Behavior  Goal: Pt/Family maintain appropriate behavior and adhere to behavioral management agreement, if implemented  Description: INTERVENTIONS:  1.  Assess patient/family's coping skills and  non-compliant behavior (including use of illegal substances)  2. Notify security of behavior or suspected illegal substances which indicate the need for search of the family and/or belongings  3. Encourage verbalization of thoughts and concerns in a socially appropriate manner  4. Utilize positive, consistent limit setting strategies supporting safety of patient, staff and others  5. Encourage participation in the decision making process about the behavioral management agreement  6. If a visitor's behavior poses a threat to safety call refer to organization policy.   7. Initiate consult with , Psychosocial CNS, Spiritual Care as appropriate  Outcome: Progressing     Problem: Nutrition Deficit:  Goal: Optimize nutritional status  Outcome: Progressing

## 2022-09-26 NOTE — PROGRESS NOTES
ONCOLOGY FOLLOW-UP:         PROBLEM LIST:       Patient Active Problem List   Diagnosis Code    Acute respiratory failure with hypoxia (HCC) J96.01    Hyperlipidemia E78.5    History of breast cancer Z85.3    Other pulmonary embolism without acute cor pulmonale (HCC) I26.99    Chronic anxiety F41.9    Myasthenia gravis ([facility] [address] G70.00    Moderate protein-calorie malnutrition (HCC) E44.0       INTERVAL HISTORY:       Pt remains admitted. No fever. Remains on Lovenox. REVIEW OF SYSTEMS:       10 point ROS completed. Pertinent positives in HPI, otherwise negative.      PHYSICAL EXAM:       Vitals:    09/26/22 0233   BP: 121/80   Pulse: 95   Resp: 18   Temp: 97.7 °F (36.5 °C)   SpO2: 95%       General appearance: drowsy  Head: Normocephalic, without obvious abnormality, atraumatic  Neck: No palpable lymphadenopathy in supraclavicular or cervical chains  Lungs: Clear to auscultation bilaterally, no audible rales, wheezes or crackles  Heart: Regular rate and rhythm, S1, S2 normal  Abdomen: Soft, non-tender; bowel sounds normal; no masses,  no organomegaly  Extremities: without cyanosis, clubbing, edema or asymmetry  Skin: No jaundice, purpura or petechiae      LABS:     Lab Results   Component Value Date    WBC 4.4 09/23/2022    HGB 10.3 (L) 09/23/2022    HCT 31.6 (L) 09/23/2022    MCV 89.5 09/23/2022     09/23/2022       Lab Results   Component Value Date    GLUCOSE 83 09/26/2022    BUN 37 (H) 09/26/2022    CREATININE 0.7 09/26/2022    K 3.7 09/26/2022    PHOS 2.8 09/24/2022       Lab Results   Component Value Date    ALKPHOS 134 (H) 09/23/2022    ALT 19 09/23/2022    AST 43 (H) 09/23/2022    BILITOT 0.5 09/23/2022    PROT 5.1 (L) 09/23/2022     Lipase 142    CEA - 14.3     - 100.5     - pending    Ig levels -   Component Ref Range & Units 9/21/22 1833    IgA 70.0 - 400.0 mg/dL 59.0 Low     IgG 700.0 - 1600.0 mg/dL 463.0 Low     IgM 40.0 - 230.0 mg/dL 40.0        SPEP - no M spike    SFLC - normal    CA 19-9 - 11    CA 27.95 - 1834    IMAGING:     XR CHEST PORTABLE  Result Date: 9/21/2022  Clear lungs. CT CHEST PULMONARY EMBOLISM W CONTRAST  Result Date: 9/21/2022  1. Multiple acute bilateral pulmonary emboli with evidence of right ventricular strain. 2. Disseminated osteolytic disease. The differential includes metastatic disease and myeloma. Acknowledgement of findings was confirmed by Austin Carrero at 10:22 am on 9/21/2022. Bone scan 9/22/22:  Abnormal uptake throughout the chest as described above. Some of the   abnormal uptake corresponds to rib and thoracic spine fractures which are   likely pathologic given the presence of multiple lytic lesions on CT. Increased uptake at L3 corresponds to compression fracture on CT which may be   acute/subacute. Constellation of findings could be due to metastatic disease   or multiple myeloma. CT A/P 9/22/22:  1. Healed bilateral lower rib fractures with subtle lytic lesions better seen   on recent chest CT. 2. Mild compression deformity along the inferior endplate of L3 may be acute   as there is increased uptake on concurrent bone scan. 3. No definite lytic lesions in the abdomen/pelvis although osteopenia limits   evaluation. 4. Extensive sigmoid diverticulosis without acute diverticulitis.      ASSESSMENT/PLAN:     Lytic Bone Lesions    - noted incidentally on CT scan  - remote history of breast cancer 30-40 years ago  - concern raised for metastatic disease vs myeloma  - no \"CRAB\" criteria present on labs - myeloma seems less likely   - checking SPEP, Ig levels, light chains - Ig levels not elevated, no M spike  - checking tumor markers - CEA, CA 29.29 and  elevated  - CT A/P shows no obvious primary  - bone scan shows bone lesions c/w metastatic disease vs. myeloma  - consider bone biopsy   - GI assessment may also be warranted depending on how aggressive family wishes to be  - pt appears quite weak and her PS is concerning regarding her ability to tolerate any aggressive therapy if she does have an advanced malignancy  - I discussed above results wit her daughter Anil Melchor - she wants to proceed with bone biopsy to establish diagnosis but is aware she realistically may not be a candidate for aggressive treatment  - order placed for bone biopsy - held last week for low 02 sats    2.   PE    - risk factor is presumed malignancy  - on Lovenox  - can continue Lovenox or switch to NOAC at discharge    Alli Arteaga MD

## 2022-09-27 VITALS
OXYGEN SATURATION: 95 % | WEIGHT: 134.7 LBS | DIASTOLIC BLOOD PRESSURE: 62 MMHG | TEMPERATURE: 97.3 F | RESPIRATION RATE: 16 BRPM | BODY MASS INDEX: 26.45 KG/M2 | HEIGHT: 60 IN | SYSTOLIC BLOOD PRESSURE: 102 MMHG | HEART RATE: 92 BPM

## 2022-09-27 LAB
ANION GAP SERPL CALCULATED.3IONS-SCNC: 13 MMOL/L (ref 3–16)
BUN BLDV-MCNC: 32 MG/DL (ref 7–20)
CA 15-3: 3146 U/ML (ref 0–31)
CALCIUM SERPL-MCNC: 10 MG/DL (ref 8.3–10.6)
CHLORIDE BLD-SCNC: 94 MMOL/L (ref 99–110)
CO2: 29 MMOL/L (ref 21–32)
CREAT SERPL-MCNC: 0.7 MG/DL (ref 0.6–1.2)
GFR AFRICAN AMERICAN: >60
GFR NON-AFRICAN AMERICAN: >60
GLUCOSE BLD-MCNC: 79 MG/DL (ref 70–99)
POTASSIUM REFLEX MAGNESIUM: 3.9 MMOL/L (ref 3.5–5.1)
SODIUM BLD-SCNC: 136 MMOL/L (ref 136–145)

## 2022-09-27 PROCEDURE — 6370000000 HC RX 637 (ALT 250 FOR IP): Performed by: INTERNAL MEDICINE

## 2022-09-27 PROCEDURE — 80048 BASIC METABOLIC PNL TOTAL CA: CPT

## 2022-09-27 PROCEDURE — 2700000000 HC OXYGEN THERAPY PER DAY

## 2022-09-27 PROCEDURE — 2580000003 HC RX 258: Performed by: INTERNAL MEDICINE

## 2022-09-27 PROCEDURE — 99239 HOSP IP/OBS DSCHRG MGMT >30: CPT | Performed by: INTERNAL MEDICINE

## 2022-09-27 PROCEDURE — 97116 GAIT TRAINING THERAPY: CPT

## 2022-09-27 PROCEDURE — 97530 THERAPEUTIC ACTIVITIES: CPT

## 2022-09-27 PROCEDURE — 97166 OT EVAL MOD COMPLEX 45 MIN: CPT

## 2022-09-27 PROCEDURE — 99232 SBSQ HOSP IP/OBS MODERATE 35: CPT | Performed by: INTERNAL MEDICINE

## 2022-09-27 PROCEDURE — 97162 PT EVAL MOD COMPLEX 30 MIN: CPT

## 2022-09-27 PROCEDURE — 94761 N-INVAS EAR/PLS OXIMETRY MLT: CPT

## 2022-09-27 PROCEDURE — 6360000002 HC RX W HCPCS: Performed by: INTERNAL MEDICINE

## 2022-09-27 PROCEDURE — 36415 COLL VENOUS BLD VENIPUNCTURE: CPT

## 2022-09-27 RX ORDER — HYDROCODONE BITARTRATE AND ACETAMINOPHEN 5; 325 MG/1; MG/1
1 TABLET ORAL EVERY 6 HOURS PRN
Status: DISCONTINUED | OUTPATIENT
Start: 2022-09-27 | End: 2022-09-27 | Stop reason: HOSPADM

## 2022-09-27 RX ORDER — ALPRAZOLAM 0.25 MG/1
0.25 TABLET ORAL 4 TIMES DAILY PRN
Qty: 10 TABLET | Refills: 0 | Status: SHIPPED | OUTPATIENT
Start: 2022-09-27 | End: 2022-09-30

## 2022-09-27 RX ORDER — IPRATROPIUM BROMIDE AND ALBUTEROL SULFATE 2.5; .5 MG/3ML; MG/3ML
3 SOLUTION RESPIRATORY (INHALATION) EVERY 4 HOURS PRN
DISCHARGE
Start: 2022-09-27

## 2022-09-27 RX ORDER — HYDROCODONE BITARTRATE AND ACETAMINOPHEN 5; 325 MG/1; MG/1
1 TABLET ORAL EVERY 6 HOURS PRN
Qty: 10 TABLET | Refills: 0 | Status: SHIPPED | OUTPATIENT
Start: 2022-09-27 | End: 2022-09-30

## 2022-09-27 RX ADMIN — FERROUS SULFATE TAB 325 MG (65 MG ELEMENTAL FE) 325 MG: 325 (65 FE) TAB at 07:47

## 2022-09-27 RX ADMIN — ACETAMINOPHEN 650 MG: 325 TABLET ORAL at 10:54

## 2022-09-27 RX ADMIN — FLUTICASONE PROPIONATE 2 SPRAY: 50 SPRAY, METERED NASAL at 07:51

## 2022-09-27 RX ADMIN — MICONAZOLE NITRATE: 20 POWDER TOPICAL at 07:50

## 2022-09-27 RX ADMIN — CARVEDILOL 6.25 MG: 6.25 TABLET, FILM COATED ORAL at 16:34

## 2022-09-27 RX ADMIN — ACETAMINOPHEN 650 MG: 325 TABLET ORAL at 02:43

## 2022-09-27 RX ADMIN — CETIRIZINE HYDROCHLORIDE 5 MG: 10 TABLET ORAL at 07:47

## 2022-09-27 RX ADMIN — HYDROCODONE BITARTRATE AND ACETAMINOPHEN 1 TABLET: 5; 325 TABLET ORAL at 14:12

## 2022-09-27 RX ADMIN — FENOFIBRATE 160 MG: 160 TABLET ORAL at 07:47

## 2022-09-27 RX ADMIN — SODIUM CHLORIDE, PRESERVATIVE FREE 10 ML: 5 INJECTION INTRAVENOUS at 07:52

## 2022-09-27 RX ADMIN — ENOXAPARIN SODIUM 70 MG: 100 INJECTION SUBCUTANEOUS at 07:48

## 2022-09-27 RX ADMIN — CARVEDILOL 6.25 MG: 6.25 TABLET, FILM COATED ORAL at 07:47

## 2022-09-27 RX ADMIN — MYCOPHENOLATE MOFETIL 500 MG: 250 CAPSULE ORAL at 07:48

## 2022-09-27 ASSESSMENT — PAIN DESCRIPTION - ORIENTATION
ORIENTATION: LEFT;RIGHT
ORIENTATION: RIGHT;LEFT
ORIENTATION: RIGHT;LEFT

## 2022-09-27 ASSESSMENT — PAIN DESCRIPTION - DESCRIPTORS
DESCRIPTORS: STABBING;THROBBING
DESCRIPTORS: ACHING;SHARP
DESCRIPTORS: SHARP;ACHING

## 2022-09-27 ASSESSMENT — PAIN SCALES - GENERAL
PAINLEVEL_OUTOF10: 8
PAINLEVEL_OUTOF10: 8
PAINLEVEL_OUTOF10: 10

## 2022-09-27 ASSESSMENT — PAIN - FUNCTIONAL ASSESSMENT
PAIN_FUNCTIONAL_ASSESSMENT: ACTIVITIES ARE NOT PREVENTED

## 2022-09-27 ASSESSMENT — PAIN DESCRIPTION - LOCATION
LOCATION: RIB CAGE

## 2022-09-27 NOTE — PROGRESS NOTES
ONCOLOGY FOLLOW-UP:         PROBLEM LIST:       Patient Active Problem List   Diagnosis Code    Acute respiratory failure with hypoxia (HCC) J96.01    Hyperlipidemia E78.5    History of breast cancer Z85.3    Other pulmonary embolism without acute cor pulmonale (HCC) I26.99    Chronic anxiety F41.9    Myasthenia gravis (HCC) G70.00    Moderate protein-calorie malnutrition (HCC) E44.0    Pleural effusion J90    Acute hypoxemic respiratory failure (HCC) J96.01    Bilateral pulmonary embolism (HCC) I26.99       INTERVAL HISTORY:       Pt remains admitted. Remains on Lovenox. Bone biopsy was cancelled yesterday. REVIEW OF SYSTEMS:       10 point ROS completed. Pertinent positives in HPI, otherwise negative.      PHYSICAL EXAM:       Vitals:    09/27/22 0730   BP: 115/77   Pulse: 100   Resp: 18   Temp: 97.6 °F (36.4 °C)   SpO2: 95%       General appearance: drowsy  Head: Normocephalic, without obvious abnormality, atraumatic  Neck: No palpable lymphadenopathy in supraclavicular or cervical chains  Lungs: Clear to auscultation bilaterally, no audible rales, wheezes or crackles  Heart: Regular rate and rhythm, S1, S2 normal  Abdomen: Soft, non-tender; bowel sounds normal; no masses,  no organomegaly  Extremities: without cyanosis, clubbing, edema or asymmetry  Skin: No jaundice, purpura or petechiae      LABS:     Lab Results   Component Value Date    WBC 4.4 09/23/2022    HGB 10.3 (L) 09/23/2022    HCT 31.6 (L) 09/23/2022    MCV 89.5 09/23/2022     09/23/2022       Lab Results   Component Value Date    GLUCOSE 79 09/27/2022    BUN 32 (H) 09/27/2022    CREATININE 0.7 09/27/2022    K 3.9 09/27/2022    PHOS 2.8 09/24/2022       Lab Results   Component Value Date    ALKPHOS 134 (H) 09/23/2022    ALT 19 09/23/2022    AST 43 (H) 09/23/2022    BILITOT 0.5 09/23/2022    PROT 5.1 (L) 09/23/2022     Lipase 142    CEA - 14.3     - 100.5     - pending    Ig levels -   Component Ref Range & Units 9/21/22 1063 be warranted depending on how aggressive family wishes to be  - pt appears quite weak and her PS is concerning regarding her ability to tolerate any aggressive therapy if she does have an advanced malignancy  - I discussed above results wit her daughter Zack Byers - she wanted to proceed with bone biopsy to establish diagnosis but is aware she realistically may not be a candidate for aggressive treatment  - bone biopsy planned 9/26/22 - cancelled by radiologist - states there are no lesions to be biopsied and he felt the lesions reported on previous imaging represented fractures and osteoporosis and did not give the appearance of tumor  - updated daughter via phone  - will plan to obtain an outpatient PET scan once discharged to further assess for malignancy    2.   PE    - risk factor is presumed malignancy  - on Lovenox  - can continue Lovenox or switch to NOAC at discharge    Audie Hernandez MD

## 2022-09-27 NOTE — FLOWSHEET NOTE
09/27/22 0205   Vital Signs   Temp 97.5 °F (36.4 °C)   Temp Source Oral   Heart Rate 99   Heart Rate Source Monitor   Resp 18   /78   BP Location Right upper arm   BP Method Automatic   MAP (Calculated) 96   Patient Position Semi fowlers   Level of Consciousness 0   MEWS Score 1   Oxygen Therapy   SpO2 98 %   O2 Device Nasal cannula   O2 Flow Rate (L/min) 4 L/min   Height and Weight   Weight 134 lb 11.2 oz (61.1 kg)   Weight Method Bed scale   BMI (Calculated) 26.4     Pt awake in bed. VS as shown above. Pt denies any further needs at this time. Call light in reach and bed in lowest position.

## 2022-09-27 NOTE — PROGRESS NOTES
Inpatient Occupational Therapy  Evaluation and Treatment    Unit: PCU  Date:  9/27/2022  Patient Name:    Miranda Mae  Admitting diagnosis:  Acute respiratory failure ([facility] [address] [J96.00]  Acute on chronic respiratory failure ([facility] [address] [J96.20]  Admit Date:  9/21/2022  Precautions/Restrictions/WB Status/ Lines/ Wounds/ Oxygen: fall risk, IV, bed/chair alarm, purewick catheter, supplemental O2 (2L), telemetry, and continuous pulse ox    Treatment Time:  910-1005  Treatment Number: 1     Billable Treatment Time: 45 minutes   Total Treatment Time:   55   minutes    Patient Goals for Therapy:  \" walk again \"      Discharge Recommendations: SNF  DME needs for discharge: defer to facility       Therapy recommendations for staff:   Assist of 2 with use of rolling walker (RW) and gait belt for all transfers to/from BSC/chair-keep surfaces close to each other    History of Present Illness: per Dr Shira Ware H&P 9/21/22:  \"70 y.o. female who presented to the hospital with a chief complaint of hypoxic respiratory failure. The patient was transferred from [facility] for hypoxic respiratory failure and concern for a PE. Patient is currently residing at a skilled nursing facility after brief stay at [facility] for diastolic heart failure. She was discharged on 2 L of oxygen. Today while taking a shower the patient had a syncopal episode and does not quite remember what happened. She became short of breath right after her syncopal episode requiring 10 L of oxygen on nonrebreather. She got to [facility] emergency department where she remained tachypneic and hypoxic. There is a concern for a pulmonary embolus her complaint of chest discomfort and an elevated D-dimer of 11 and a mildly elevated troponin 0.1. She had a CT of abdomen pelvis with contrast because she had elevated lipase levels. She was empirically started on a heparin drip because of this concern for ACS versus PE.   A CTA was not obtained given a mildly elevated creatinine. The decision was made to transfer the patient to higher level of care. Of note on arrival to [place] ICU, we were able to wean the patient off nonrebreather and she was stable and satting well on 2 L of oxygen. She endorsed severe anxiety and denied any chest discomfort at this time. She appeared more calm and was awake and alert. \"    Home Health S4 Level Recommendation:  NA  AM-PAC Score: AM-PAC Inpatient Daily Activity Raw Score: 13    Preadmission Environment  in from SNF stay - fell in shower/syncopal episode while there for short term rehabilitation  Reports she has been ill and having difficulty eating since May 2022. Pt. Lives with family ([address], works during the day)  Home environment:  one story home  Steps to enter first floor:    1 steps to enter    Steps to second floor: N/A  Bathroom:  Tub/Shower unit, Grab bars, and Shower Chair   Equipment owned:  [facility] and  [facility] / PLOF:  History of falls   Yes-slipped and fell in living room 1 month ago, passed out in shower prior to this admit  Pt. Able to drive   No, dtr drives to appointments  Pt Fully independent with ADL's  No-dtrs assisted prior to recent hospitalization, staff assisted at SNF  Pt. Required assistance from family for:  Bathing, Cleaning, Cooking, Dressing, and Laundry     Pt. Fully independent for transfers and gait and walked with: Rollator sometimes, unclear - reports not getting up recently    Pain  Yes  Ratin  Location:B flank pain (\"my ribs on the sides and it goes around to the back\")  Pain Medicine Status: Received pain med prior to tx      Cognition    A&O x4   Able to follow 1 step commands    Subjective  Patient lying supine in bed with no family present   Pt agreeable to this OT eval & tx.      Upper Extremity ROM:    Impaired B shoulder flexion, limited assessment due to B flank pain with UE movement    Upper Extremity Strength:    Strength Assessment (measured on a 0-5 scale): proximal NT due to pain with shoulder movement, distal 3/5    Upper Extremity Sensation    WFL    Upper Extremity Proprioception:  WFL    Coordination and Tone  WFL    Balance  Functional Sitting Balance:  Impaired max A initially at EOB, improved to SBA with time  Functional Standing Balance:Impaired MIN A of 2 with RW    Bed mobility:    Supine to sit: Max A of 2  Sit to supine:   Not Tested  Rolling: Mod A  Scooting in sitting: Mod A  Scooting to head of bed:   Not Tested    Bridging:   Not Tested    Transfers:    Sit to stand:  Min A of 2 and VC for hand placement  Stand to sit:  Min A of 2 and VC for hand placement  Bed to chair:   Min A of 2, with use of RW, and VC for hand placement  Standard toilet: Not Tested  Bed to [facility]:  Not Tested    Dressing:      UE:   Not Tested  LE:    Max A don socks    Bathing:    UE:  Not Tested  LE:  Not Tested    Eating:   Independent beverage management    Toileting:  Not Tested-purewick in place    Grooming: Not Tested    Activity Tolerance   Pt completed therapy session with Dizziness noted with upright posture/position changes, pain throughout session   Supine at rest  SpO2: 92% on 2L  HR: 97  BP: 102/67    At the EOB sitting:  SpO2: 97% on 2L  HR: 103 - 105 bpm  BP: 98/68    Up in recliner after pivot transfer:  SpO2: 97% on 2L  HR: 107  BP: 96/62    Positioning Needs:   Up in chair, call light and needs in reach. Alarm Set    Exercise / Activities Initiated:   Hand flex/ext:  x10    Patient/Family Education:   Role of OT  Recommendations for DC  Energy conservation techniques  Safe RW use/hand placement    Assessment of Deficits: Pt seen for Occupational therapy evaluation in acute care setting. Pt demonstrated decreased Activity tolerance, ADLs, IADLs, Bed mobility, Safety Awareness, Strength, and Transfers. Pt functioning below baseline and will likely benefit from skilled occupational therapy services to maximize safety and independence. Goal(s) : To be met in 3 Visits:  1). Bed to toilet/BSC: Min A    To be met in 5 Visits:  1). Supine to/from Sit:  Min A  2). Upper Body Bathing:   Min A  3). Lower Body Bathing: Mod A  4). Upper Body Dressing:  Min A  5). Lower Body Dressing: Mod A  6). Pt to demonstrate UE exs x 15 reps with minimal cues    Rehabilitation Potential:  Good for goals listed above. Strengths for achieving goals include: Pt cooperative  Barriers to achieving goals include:  Complexity of condition and Pain     Plan: To be seen 3-5 x/wk while in acute care setting for therapeutic exercises, bed mobility, transfers, dressing, bathing, family/patient education, ADL/IADL retraining, energy conservation training.      Yonis Ewing, OTR/L [id number]            If patient discharges from this facility prior to next visit, this note will serve as the Discharge Summary

## 2022-09-27 NOTE — PROGRESS NOTES
Pulmonary Progress Note    CC: Acute PE, hypoxia    Subjective:    O2 trending down, 2 L  Appears comfortable    EXAM: /77   Pulse 100   Temp 97.6 °F (36.4 °C) (Oral)   Resp 18   Ht 5' (1.524 m)   Wt 134 lb 11.2 oz (61.1 kg)   LMP  (LMP Unknown) Comment: post menopausal  SpO2 95%   BMI 26.31 kg/m²  on 4L  Constitutional:  No acute distress. Eyes: PERRL. Conjunctivae anicteric. ENT: Normal nose. Normal tongue. Neck:  Trachea is midline. No thyroid tenderness. Respiratory: No accessory muscle usage. Decreased breath sounds. No wheezes. No rales. No Rhonchi. Cardiovascular: Normal S1S2. No digit clubbing. No digit cyanosis. No LE edema. Psychiatric: No anxiety or Agitation. Alert and Oriented to person, place and time. Scheduled Meds:   fluticasone  2 spray Each Nostril Daily    miconazole   Topical BID    sodium chloride flush  5-40 mL IntraVENous 2 times per day    enoxaparin  1 mg/kg SubCUTAneous BID    atorvastatin  10 mg Oral Nightly    carvedilol  6.25 mg Oral BID WC    fenofibrate  160 mg Oral Daily    ferrous sulfate  325 mg Oral Daily with breakfast    cetirizine  5 mg Oral Daily    mycophenolate  1,000 mg Oral Nightly    mycophenolate  500 mg Oral QAM     Continuous Infusions:   sodium chloride       PRN Meds:  loperamide, iohexol, diatrizoate meglumine-sodium, iopamidol, sodium chloride flush, sodium chloride, ondansetron **OR** ondansetron, polyethylene glycol, acetaminophen **OR** acetaminophen, potassium chloride **OR** potassium alternative oral replacement **OR** potassium chloride, magnesium sulfate, ipratropium-albuterol, ALPRAZolam, traMADol, docusate sodium, melatonin    Labs:  CBC:   No results for input(s): WBC, HGB, HCT, MCV, PLT in the last 72 hours.   BMP:   Recent Labs     09/25/22  0422 09/26/22  0421 09/27/22  0409   * 137 136   K 3.4* 3.7 3.9   CL 94* 97* 94*   CO2 30 28 29   BUN 39* 37* 32*   CREATININE 0.9 0.7 0.7       TTE: EF 55%, Grade 2 DD, RV enlarged and spap est 60      CT chest 9/21  1. Multiple acute bilateral pulmonary emboli with evidence of right   ventricular strain. 2. Disseminated osteolytic disease.   The differential includes metastatic   disease and myeloma    Chest x-ray 9/23  Small left pleural effusion/airspace disease      ASSESSMENT:  Acute hypoxemic respiratory failure  Bilateral PEs possible secondary to malignancy with RV strain  Small left pleural effusion   Lytic bone lesions  Chronic anxiety on xanax  Myasthenia gravis on cellcept     PLAN:  Supplemental oxygen to maintain SaO2 >92%; wean as tolerated    Oncology following-plan for outpatient PET scan  Therapeutic lovenox - ok to switch to noac   DC plan is okay with pulmonary

## 2022-09-27 NOTE — PROGRESS NOTES
Physician Progress Note      PATIENT:               Tex Tamez  CSN #:                  [id number]  :                       1945  ADMIT DATE:       2022 12:11 AM  DISCH DATE:  RESPONDING  PROVIDER #:        Ally Bonilla MD          QUERY TEXT:    Pt admitted with Acute PE suspected due to malignancy. Noted documentation of    moderate malnutrition per the dietary consultant on progress note dated    and . If possible, please document in progress notes and discharge   summary:    The medical record reflects the following:  Risk Factors: inadequate protein-energy intake, altered GI function, increase   demand for energy/nutrients, early satiety  Clinical Indicators: poor intake prior to admission, intake 0-25%, wounds,   mild loss of subcutaneous fat, mild muscle loss, lab values, GI abnormality,   nausea  Treatment: dietician consult, NPO until medically cleared, monitor for diet   advancement, po intake, trend weights, labs, bowel function    Thank you for your assistance,  Jhoana Ibrahim RN,BSN,CCDS,CRCR  Options provided:  -- Moderate malnutrition confirmed present on admission  -- Moderate malnutrition confirmed not present on admission  -- Moderate malnutrition ruled out  -- Other - I will add my own diagnosis  -- Disagree - Not applicable / Not valid  -- Disagree - Clinically unable to determine / Unknown  -- Refer to Clinical Documentation Reviewer    PROVIDER RESPONSE TEXT:    The diagnosis of moderate malnutrition was confirmed as present on admission.     Query created by: Enma Angela on 2022 2:38 PM      Electronically signed by:  Ally Bonilla MD 2022 6:16 AM

## 2022-09-27 NOTE — PROGRESS NOTES
Inpatient Physical Therapy Evaluation and Treatment    Unit: PCU  Date:  9/27/2022  Patient Name:    Jesus Hastings  Admitting diagnosis:  Acute respiratory failure ([address] [J96.00]  Acute on chronic respiratory failure ([address] [J96.20]  Admit Date:  9/21/2022  Precautions/Restrictions/WB Status/ Lines/ Wounds/ Oxygen: Fall risk, Bed/chair alarm, Lines -IV, Supplemental O2 (2L/min), and Purewick catheter, Telemetry, and Continuous pulse oximetry    Treatment Time: 0920 - 1000  Treatment Number:  1   Timed Code Treatment Minutes: 30 minutes  Total Treatment Minutes:  40  minutes    Patient Goals for Therapy: \" Go home \"          Discharge Recommendations: SNF  DME needs for discharge: defer to facility       Therapy recommendation for EMS Transport: requires transport by cot due to need of 2 person assist for functional transfers    Therapy recommendations for staff:   Assist of 2 with use of rolling walker (RW) and gait belt for all transfers and ambulation to/from [facility]  to/from [facility]    History of Present Illness: H & P as per Rosie Seaman MD's note dated 9/21/2022  67 y.o. female who presented to the hospital with a chief complaint of hypoxic respiratory failure. The patient was transferred from [facility] for hypoxic respiratory failure and concern for a PE. Patient is currently residing at a skilled nursing facility after brief stay at [facility] for diastolic heart failure. She was discharged on 2 L of oxygen. Today while taking a shower the patient had a syncopal episode and does not quite remember what happened. She became short of breath right after her syncopal episode requiring 10 L of oxygen on nonrebreather. She got to [facility] emergency department where she remained tachypneic and hypoxic. There is a concern for a pulmonary embolus her complaint of chest discomfort and an elevated D-dimer of 11 and a mildly elevated troponin 0.1.   She had a CT of abdomen pelvis with contrast because she had elevated lipase levels. She was empirically started on a heparin drip because of this concern for ACS versus PE. A CTA was not obtained given a mildly elevated creatinine. The decision was made to transfer the patient to higher level of care. Of note on arrival to [facility] ICU, we were able to wean the patient off nonrebreather and she was stable and satting well on 2 L of oxygen. She endorsed severe anxiety and denied any chest discomfort at this time. She appeared more calm and was awake and alert. Home Health S4 Level Recommendation:  Level 3 Safety  AM-PAC Mobility Score    AM-PAC Inpatient Mobility Raw Score : 12       Preadmission Environment  in from SNF stay - fell in shower/syncopal episode while there for short term rehabilitation  Reports she has been ill and having difficulty eating since May 2022. Pt. Lives with family ([address] dtr, works during the day)  Home environment:    one story home  Steps to enter first floor:    1 steps to enter      Steps to second floor: N/A  Bathroom:       Tub/Shower unit, Grab bars, and Shower Chair   Equipment owned:      [address] / PLOF:  History of falls             Yes-slipped and fell in living room 1 month ago, passed out in shower prior to this admit  Pt. Able to drive          No, dtr drives to appointments  Pt Fully independent with ADL's         No-dtrs assisted prior to recent hospitalization, staff assisted at SNF  Pt. Required assistance from family for:  Bathing, Cleaning, Cooking, Dressing, and Laundry     Pt.  Fully independent for transfers and gait and walked with: Rollator sometimes, unclear - reports not getting up recently    Pain  Yes  Ratin  Location:B flank pain (\"my ribs on the sides and it goes around to the back\")  Pain Medicine Status: Received pain med prior to tx      Cognition    A&O x4   Able to follow 1 step commands with increased anxiety    Subjective  Patient 80/58    RN notified about the treatment response. Positioning Needs   Pt up in chair, alarm set, positioned in proper neutral alignment and pressure relief provided. Call light provided and all needs within reach    Exercises Initiated  all completed bilaterally unless indicated  Ankle Pumps x 10 reps  Heel slides x 10 reps    Other  None. Patient/Family Education   Pt educated on role of inpatient PT, POC, importance of continued activity, DC recommendations, safety awareness, transfer techniques, pursed lip breathing, energy conservation, pacing activity, and calling for assist with mobility. Assessment  Pt seen for Physical Therapy evaluation in acute care setting. Pt demonstrated decreased Activity tolerance, Balance, Safety, and Strength as well as decreased independence with Ambulation, Bed Mobility , and Transfers. Recommending SNF upon discharge as patient functioning well below baseline, demonstrates good rehab potential and unable to return home due to limited or no family support, inability to negotiate stairs to enter home/bedroom/bathroom, burden of care beyond caregiver ability, home environment not conducive to patient recovery, and limited safety awareness. Goals : To be met in 3 visits:  1). Independent with LE Ex x 10 reps    To be met in 6 visits:  1). Supine to/from sit: Min A   2). Sit to/from stand: Min A   3). Bed to chair: Min A   4). Gait: Ambulate  10 ft.   with  Min A  and use of LRAD (least restrictive assistive device)  5). Tolerate B LE exercises 3 sets of 10-15 reps    Rehabilitation Potential: Fair  Strengths for achieving goals include:   Pt motivated   Barriers to achieving goals include:    Weakness and increased anxiety limiting her from participating in functional mobility out of the zone of comfort.     Plan    To be seen 3-5 x / week  while in acute care setting for therapeutic exercises, bed mobility, transfers, progressive gait training, balance training, and family/patient education. Signature: Job Dawson MS PT, # [id number]    If patient discharges from this facility prior to next visit, this note will serve as the Discharge Summary.

## 2022-09-27 NOTE — PLAN OF CARE
Problem: Discharge Planning  Goal: Discharge to home or other facility with appropriate resources  9/27/2022 1547 by Terie Landau, RN  Outcome: Completed  9/27/2022 0744 by Terie Landau, RN  Outcome: Progressing     Problem: Pain  Goal: Verbalizes/displays adequate comfort level or baseline comfort level  9/27/2022 1547 by Terie Landau, RN  Outcome: Completed  9/27/2022 0744 by Terie Landau, RN  Outcome: Progressing     Problem: Neurosensory - Adult  Goal: Achieves stable or improved neurological status  9/27/2022 1547 by Terie Landau, RN  Outcome: Completed  9/27/2022 0744 by Terie Landau, RN  Outcome: Progressing     Problem: Respiratory - Adult  Goal: Achieves optimal ventilation and oxygenation  9/27/2022 1547 by Terie Landau, RN  Outcome: Completed  9/27/2022 0744 by Terie Landau, RN  Outcome: Progressing     Problem: Cardiovascular - Adult  Goal: Maintains optimal cardiac output and hemodynamic stability  9/27/2022 1547 by Terie Landau, RN  Outcome: Completed  9/27/2022 0744 by Terie Landau, RN  Outcome: Progressing     Problem: Skin/Tissue Integrity - Adult  Goal: Skin integrity remains intact  9/27/2022 1547 by Terie Landau, RN  Outcome: Completed  Flowsheets (Taken 9/27/2022 0745)  Skin Integrity Remains Intact: Monitor for areas of redness and/or skin breakdown  9/27/2022 0744 by Terie Landau, RN  Outcome: Progressing  Goal: Incisions, wounds, or drain sites healing without S/S of infection  9/27/2022 1547 by Terie Landau, RN  Outcome: Completed  9/27/2022 0744 by Terie Landau, RN  Outcome: Progressing     Problem: Musculoskeletal - Adult  Goal: Return mobility to safest level of function  9/27/2022 1547 by Terie Landau, RN  Outcome: Completed  9/27/2022 0744 by Terie Landau, RN  Outcome: Progressing     Problem: Gastrointestinal - Adult  Goal: Maintains or returns to baseline bowel function  9/27/2022 1547 by Chepe Montoya Trent Johansen RN  Outcome: Completed  9/27/2022 0744 by Beata Graff RN  Outcome: Progressing     Problem: Genitourinary - Adult  Goal: Absence of urinary retention  9/27/2022 1547 by Beata Graff RN  Outcome: Completed  9/27/2022 0744 by Beata Graff RN  Outcome: Progressing     Problem: Infection - Adult  Goal: Absence of infection at discharge  9/27/2022 1547 by Beata Graff RN  Outcome: Completed  9/27/2022 0744 by Beata Graff RN  Outcome: Progressing     Problem: Metabolic/Fluid and Electrolytes - Adult  Goal: Electrolytes maintained within normal limits  9/27/2022 1547 by Beata Graff RN  Outcome: Completed  9/27/2022 0744 by Beata Graff RN  Outcome: Progressing     Problem: Safety - Adult  Goal: Free from fall injury  9/27/2022 1547 by Beata Graff RN  Outcome: Completed  9/27/2022 0744 by Beata Graff RN  Outcome: Progressing     Problem: ABCDS Injury Assessment  Goal: Absence of physical injury  9/27/2022 1547 by Beata Graff RN  Outcome: Completed  9/27/2022 0744 by Beata Graff RN  Outcome: Progressing     Problem: Skin/Tissue Integrity  Goal: Absence of new skin breakdown  Description: 1. Monitor for areas of redness and/or skin breakdown  2. Assess vascular access sites hourly  3. Every 4-6 hours minimum:  Change oxygen saturation probe site  4. Every 4-6 hours:  If on nasal continuous positive airway pressure, respiratory therapy assess nares and determine need for appliance change or resting period.   9/27/2022 1547 by Beata Graff RN  Outcome: Completed  9/27/2022 0744 by Beata Graff RN  Outcome: Progressing     Problem: Chronic Conditions and Co-morbidities  Goal: Patient's chronic conditions and co-morbidity symptoms are monitored and maintained or improved  9/27/2022 1547 by Beata Graff RN  Outcome: Completed  9/27/2022 0744 by Beata Graff RN  Outcome: Progressing     Problem: Coping  Goal: Pt/Family able to verbalize concerns and demonstrate effective coping strategies  Description: INTERVENTIONS:  1. Assist patient/family to identify coping skills, available support systems and cultural and spiritual values  2. Provide emotional support, including active listening and acknowledgement of concerns of patient and caregivers  3. Reduce environmental stimuli, as able  4. Instruct patient/family in relaxation techniques, as appropriate  5. Assess for spiritual pain/suffering and initiate Spiritual Care, Psychosocial Clinical Specialist consults as needed  9/27/2022 1547 by Clare Gunderson RN  Outcome: Completed  9/27/2022 0744 by Clare Gunderson RN  Outcome: Progressing     Problem: Decision Making  Goal: Pt/Family able to effectively weigh alternatives and participate in decision making related to treatment and care  Description: INTERVENTIONS:  1. Determine when there are differences between patient's view, family's view, and healthcare provider's view of condition  2. Facilitate patient and family articulation of goals for care  3. Help patient and family identify pros/cons of alternative solutions  4. Provide information as requested by patient/family  5. Respect patient/family right to receive or not to receive information  6. Serve as a liaison between patient and family and health care team  7. Initiate Consults from Ethics, Palliative Care or initiate [facility] as is appropriate  9/27/2022 1547 by Clare Gunderson RN  Outcome: Completed  9/27/2022 0744 by Clare Gunderson RN  Outcome: Progressing     Problem: Behavior  Goal: Pt/Family maintain appropriate behavior and adhere to behavioral management agreement, if implemented  Description: INTERVENTIONS:  1. Assess patient/family's coping skills and  non-compliant behavior (including use of illegal substances)  2.  Notify security of behavior or suspected illegal substances which indicate the need for search of the family and/or belongings  3. Encourage verbalization of thoughts and concerns in a socially appropriate manner  4. Utilize positive, consistent limit setting strategies supporting safety of patient, staff and others  5. Encourage participation in the decision making process about the behavioral management agreement  6. If a visitor's behavior poses a threat to safety call refer to organization policy.   7. Initiate consult with , Psychosocial CNS, Spiritual Care as appropriate  9/27/2022 1547 by Kike Ray RN  Outcome: Completed  9/27/2022 0744 by Kike Ray RN  Outcome: Progressing     Problem: Nutrition Deficit:  Goal: Optimize nutritional status  9/27/2022 1547 by Kike Ray, RN  Outcome: Completed  9/27/2022 0744 by Kike Ray, RN  Outcome: Progressing

## 2022-09-27 NOTE — CARE COORDINATION
DISCHARGE ORDER  Date/Time 2022 2:50 PM  Completed by: Malcolm Zaldivar RN, Case Management    Patient Name: Nati Blackburn    : 1945  Admit order Date and Status: INPT 22  Noted discharge order. (verify MD's last order for status of admission/Traditional Medicare 3 [place] Inpatient qualifying stay required for SNF)    Confirmed discharge plan with:              Patient:  Yes              When pt confirms DC plan does any support person need to be contacted by CM Yes if yes who__pt dtr____                      Discharge to Facility: [facility] phone number for staff giving report: [phone]   Pre-certification completed: [facility] Exemption Notification (HENS) completed: n/a   Discharge orders and Continuity of Care faxed to facility:  yes      Transportation:               Medical Transport explained with choice list offered to pt/family. Choice:(no preference)  Agency used: [employer]   time:   5565-0566      Pt/family/Nursing/Facility aware of  time:   Yes Names: pt, nurse, dtr, ,facility  Ambulance form completed:  yes:      Date Last IMM Given: 22    Comments:-    Pt is being d/c'd to [facility] today. Pt's O2 sats are 93% on 4lpm O2. Discharge timeout done with [facility]. All discharge needs and concerns addressed. Discharging nurse to complete NIMISHA, reconcile AVS, and place final copy with patient's discharge packet. Discharging RN to ensure that written prescriptions for  Level II medications are sent with patient to the facility as per protocol.

## 2022-09-27 NOTE — DISCHARGE INSTR - COC
Continuity of Care Form    Patient Name: Dann Luna   :    MRN:  [id number]    Admit date:  2022  Discharge date:  22     Code Status Order: Full Code   Advance Directives:     Admitting Physician:  Shamika Lynne MD  PCP: No primary care provider on file. Discharging Nurse: Matilda Marmolejo Unit/Room#: /6920-04  Discharging Unit Phone Number: [phone]    Emergency Contact:   Extended Emergency Contact Information  Primary Emergency Contact: [address] Phone: [phone]  Mobile Phone: [phone]  Relation: Child  Preferred language: English   needed? No  Secondary Emergency Contact: [facility] Phone: [phone]  Mobile Phone: [phone]  Relation: Child  Preferred language: English   needed? No    Past Surgical History:  Past Surgical History:   Procedure Laterality Date    BREAST SURGERY         Immunization History: There is no immunization history on file for this patient.     Active Problems:  Patient Active Problem List   Diagnosis Code    Acute respiratory failure with hypoxia ([facility]) J96.01    Hyperlipidemia E78.5    History of breast cancer Z85.3    Other pulmonary embolism without acute cor pulmonale ([facility]) I26.99    Chronic anxiety F41.9    Myasthenia gravis ([facility]) G70.00    Moderate protein-calorie malnutrition ([facility]) E44.0    Pleural effusion J90    Acute hypoxemic respiratory failure ([facility]) J96.01    Bilateral pulmonary embolism ([facility]) I26.99       Isolation/Infection:   Isolation            No Isolation          Patient Infection Status       None to display            Nurse Assessment:  Last Vital Signs: /77   Pulse 100   Temp 97.6 °F (36.4 °C) (Oral)   Resp 18   Ht 5' (1.524 m)   Wt 134 lb 11.2 oz (61.1 kg)   LMP  (LMP Unknown) Comment: post menopausal  SpO2 95%   BMI 26.31 kg/m²     Last documented pain score (0-10 scale): Pain Level: 8  Last Weight:   Wt Readings from Last 1 Encounters:   09/27/22 134 lb 11.2 oz (61.1 kg)     Mental Status:  oriented    IV Access:  - None    Nursing Mobility/ADLs:  Walking   Assisted  Transfer  Assisted  Bathing  Assisted  Dressing  Assisted  Toileting  Assisted  Feeding  Independent  Med Admin  Independent  Med Delivery   whole    Wound Care Documentation and Therapy:  Wound 09/21/22 Buttocks Left (Active)   Dressing Status Clean;Dry; Intact; New dressing applied 09/27/22 0737   Wound Cleansed Cleansed with saline 09/25/22 1030   Dressing/Treatment Foam 09/27/22 0737   Dressing Change Due 09/26/22 09/25/22 1030   Wound Assessment Dry;Erythema 09/25/22 1030   Drainage Amount None 09/25/22 1030   Odor None 09/25/22 1030   Number of days: 6       Wound 09/21/22 Buttocks Right (Active)   Wound Etiology Pressure Stage 2 09/27/22 0737   Dressing Status Clean;Dry; Intact; New dressing applied 09/27/22 0737   Wound Cleansed Cleansed with saline 09/25/22 1030   Dressing/Treatment Foam 09/27/22 0737   Dressing Change Due 09/26/22 09/25/22 1030   Drainage Amount None 09/25/22 1030   Odor None 09/25/22 1030   Number of days: 6       Wound 09/21/22 Heel Left (Active)   Dressing Status Clean;Dry; Intact; New dressing applied 09/27/22 0737   Wound Cleansed Cleansed with saline 09/25/22 1030   Dressing/Treatment Foam 09/27/22 0737   Dressing Change Due 09/26/22 09/25/22 1030   Wound Assessment Purple/maroon 09/25/22 1030   Drainage Amount None 09/25/22 1030   Odor None 09/25/22 1030   Melonie-wound Assessment Blanchable erythema 09/25/22 1030   Number of days: 6       Wound 09/21/22 Heel Right (Active)   Dressing Status Clean;Dry; Intact 09/27/22 0737   Wound Cleansed Cleansed with saline 09/25/22 1030   Dressing/Treatment Foam 09/27/22 0737   Dressing Change Due 09/26/22 09/25/22 1030   Wound Assessment Purple/maroon 09/25/22 1030   Drainage Amount None 09/25/22 1030   Odor None 09/25/22 1030   Melonie-wound Assessment Blanchable erythema 09/25/22 1030   Number of days: 6 Elimination:  Continence: Bowel: No  Bladder: No  Urinary Catheter: None   Colostomy/Ileostomy/Ileal Conduit: No       Date of Last BM: 9/26/22    Intake/Output Summary (Last 24 hours) at 9/27/2022 0856  Last data filed at 9/27/2022 0331  Gross per 24 hour   Intake 520 ml   Output 250 ml   Net 270 ml     I/O last 3 completed shifts: In: 26 [P.O.:520]  Out: 250 [Urine:250]    Safety Concerns: At Risk for Falls    Impairments/Disabilities:      Vision    Nutrition Therapy:  Current Nutrition Therapy:   - Oral Diet:  General    Routes of Feeding: Oral  Liquids: No Restrictions  Daily Fluid Restriction: no  Last Modified Barium Swallow with Video (Video Swallowing Test): not done    Treatments at the Time of Hospital Discharge:   Respiratory Treatments: N/A  Oxygen Therapy:  is on oxygen at 2 L/min per nasal cannula.   Ventilator:    - No ventilator support    Rehab Therapies: Physical Therapy and Occupational Therapy  Weight Bearing Status/Restrictions: No weight bearing restrictions  Other Medical Equipment (for information only, NOT a DME order):  walker  Other Treatments: N/A    Patient's personal belongings (please select all that are sent with patient):  Glasses    RN SIGNATURE:  Electronically signed by Nicolas Dixon RN on 9/27/22 at 9:12 AM EDT    CASE MANAGEMENT/SOCIAL WORK SECTION    Inpatient Status Date: 9/21/22    Readmission Risk Assessment Score:  Readmission Risk              Risk of Unplanned Readmission:  14           Discharging to Facility/ Agency   Name: Name: [facility]  Address: [address] [place] [address]  Phone: [phone]  Fax: [phone]   Address:  Phone:  Fax:    Dialysis Facility (if applicable)   Name:  Address:  Dialysis Schedule:  Phone:  Fax:    / signature: Electronically signed by Jareth March RN on 9/27/22 at 2:42 PM EDT    PHYSICIAN SECTION    Prognosis: Fair    Condition at Discharge: Stable    Rehab Potential (if

## 2022-09-27 NOTE — PROGRESS NOTES
Report called to [address] at [facility]. Pt. In stable condition awaiting for transport.

## 2022-09-27 NOTE — PLAN OF CARE
Problem: Discharge Planning  Goal: Discharge to home or other facility with appropriate resources  Outcome: Progressing     Problem: Pain  Goal: Verbalizes/displays adequate comfort level or baseline comfort level  Outcome: Progressing     Problem: Neurosensory - Adult  Goal: Achieves stable or improved neurological status  Outcome: Progressing     Problem: Respiratory - Adult  Goal: Achieves optimal ventilation and oxygenation  Outcome: Progressing     Problem: Cardiovascular - Adult  Goal: Maintains optimal cardiac output and hemodynamic stability  Outcome: Progressing     Problem: Skin/Tissue Integrity - Adult  Goal: Skin integrity remains intact  Outcome: Progressing  Goal: Incisions, wounds, or drain sites healing without S/S of infection  Outcome: Progressing     Problem: Musculoskeletal - Adult  Goal: Return mobility to safest level of function  Outcome: Progressing     Problem: Gastrointestinal - Adult  Goal: Maintains or returns to baseline bowel function  Outcome: Progressing     Problem: Genitourinary - Adult  Goal: Absence of urinary retention  Outcome: Progressing     Problem: Infection - Adult  Goal: Absence of infection at discharge  Outcome: Progressing     Problem: Metabolic/Fluid and Electrolytes - Adult  Goal: Electrolytes maintained within normal limits  Outcome: Progressing     Problem: Safety - Adult  Goal: Free from fall injury  Outcome: Progressing     Problem: ABCDS Injury Assessment  Goal: Absence of physical injury  Outcome: Progressing     Problem: Skin/Tissue Integrity  Goal: Absence of new skin breakdown  Description: 1. Monitor for areas of redness and/or skin breakdown  2. Assess vascular access sites hourly  3. Every 4-6 hours minimum:  Change oxygen saturation probe site  4. Every 4-6 hours:  If on nasal continuous positive airway pressure, respiratory therapy assess nares and determine need for appliance change or resting period.   Outcome: Progressing     Problem: Chronic

## 2022-09-27 NOTE — DISCHARGE SUMMARY
Name:  Geneva Alvarez  Room:  /3281-97  MRN:    [id number]    Discharge Summary      This discharge summary is in conjunction with a complete physical exam done on the day of discharge. Attending Physician: Dr. Angel Kaur  Discharging Physician: Dr. Ayush Neves: 9/21/2022  Discharge:  9/27/2022    HPI:  67 y.o. female who presented to the hospital with a chief complaint of hypoxic respiratory failure. The patient was transferred from [facility] for hypoxic respiratory failure and concern for a PE. Patient is currently residing at a skilled nursing facility after brief stay at [facility] for diastolic heart failure. She was discharged on 2 L of oxygen. Today while taking a shower the patient had a syncopal episode and does not quite remember what happened. She became short of breath right after her syncopal episode requiring 10 L of oxygen on nonrebreather. She got to [place] [address] emergency department where she remained tachypneic and hypoxic. There is a concern for a pulmonary embolus her complaint of chest discomfort and an elevated D-dimer of 11 and a mildly elevated troponin 0.1. She had a CT of abdomen pelvis with contrast because she had elevated lipase levels. She was empirically started on a heparin drip because of this concern for ACS versus PE. A CTA was not obtained given a mildly elevated creatinine. The decision was made to transfer the patient to higher level of care. Of note on arrival to [facility], we were able to wean the patient off nonrebreather and she was stable and satting well on 2 L of oxygen. She endorsed severe anxiety and denied any chest discomfort at this time. She appeared more calm and was awake and alert. Diagnoses this Admission and Hospital Course:    #Acute bilateral pulmonary embolism likely related to underlying malignancy. Started on Lovenox Tx  -> transitioned to NOAC , DC on Eliquis .  Sats stable      # Osteolytic bone lesions. Etiology unclear. Bone scan and CT abdomen and pelvis ordered by oncology. No primary evident at this time. MM work up so far unremarkable. Remote hx of breast Ca. CA 27/29 markedly elevated at 2678 - this may be an indication of recurrent breast Ca with heavy tumor burden.   ->    Plan was for bone biopsy - ordered for Monday  - pending IR eval   -> bone biopsy canceled --> Radiologist reviewed imaging studies and he does not think these are lytic bone lesions,  he thinks is a bone fractures. Oncology has reevaluated pt today\  No further plans on bone biopsy. Plan is to discharge back to nursing home. She will need outpatient PET scan. #Myasthenia gravis- on CellCept. #Acute Hypoxic respiratory failure due to pulmonary embolism on supplemental oxygen. 4 L O2. Wean as tolerated. #Chronic anxiety on Xanax. #Generalized weakness consult PT and OT. #Moderate PCM  - encourage nutritional supplements     # chronic pain  - robaxin, norco prn      #code status ; pt was  DNR CC--> pt indicated that she wanted to be a full code ; code status changed to Full Code now       DC to SNF          Procedures (Please Review Full Report for Details)  N/A    Consults    Hem/Onc  Pulmonology       Physical Exam at Discharge:    /76   Pulse 98   Temp 98.9 °F (37.2 °C) (Oral)   Resp 16   Ht 5' (1.524 m)   Wt 134 lb 11.2 oz (61.1 kg)   LMP  (LMP Unknown) Comment: post menopausal  SpO2 93%   BMI 26.31 kg/m²   General: Awake and appears weak. Ill-appearing. Eyes: PERRL. No sclera icterus. No conjunctiva injected. ENT: No discharge. Pharynx clear. Neck: Trachea midline. Normal thyroid. Resp: No accessory muscle use. No crackles. No wheezing. No rhonchi. No dullness on percussion. CV: Regular rate. Regular rhythm. No mumur or rub. No edema. No JVD. Palpable pedal pulses. GI: Non-tender. Non-distended. No masses. No organmegaly. Normal bowel sounds. No hernia. Skin: Warm and dry. No nodule on exposed extremities. No rash on exposed extremities. Lymph: No cervical LAD. No supraclavicular LAD. M/S: No cyanosis. No joint deformity. No clubbing. Neuro: Awake. Follows commands. Positive pupils/gag/corneals. Normal pain response. Psych: Oriented to person, place, time. No anxiety or agitation. BMP:   Recent Labs     09/25/22  0422 09/26/22  0421 09/27/22  0409   * 137 136   K 3.4* 3.7 3.9   CL 94* 97* 94*   CO2 30 28 29   BUN 39* 37* 32*   CREATININE 0.9 0.7 0.7       PT/INR:   Recent Labs     09/26/22  0856   PROTIME 14.3   INR 1.13       ABG    Lab Results   Component Value Date/Time    FFF8MSM 28.3 09/23/2022 09:55 AM    BEART 4.1 09/23/2022 09:55 AM    F5QWNKLA 96.6 09/23/2022 09:55 AM    PHART 7.460 09/23/2022 09:55 AM    GAO7NLN 40.7 09/23/2022 09:55 AM    PO2ART 82.5 09/23/2022 09:55 AM    XZM8OKZ 29.5 09/23/2022 09:55 AM         CULTURES  COVID-19 negative    RADIOLOGY  XR CHEST PORTABLE   Final Result   Interval development of a small left pleural effusion with left basilar   airspace opacities which could represent atelectasis or pneumonia         NM BONE SCAN WHOLE BODY   Final Result   Abnormal uptake throughout the chest as described above. Some of the   abnormal uptake corresponds to rib and thoracic spine fractures which are   likely pathologic given the presence of multiple lytic lesions on CT. Increased uptake at L3 corresponds to compression fracture on CT which may be   acute/subacute. Constellation of findings could be due to metastatic disease   or multiple myeloma. CT ABDOMEN PELVIS W IV CONTRAST Additional Contrast? Oral   Final Result   1. Healed bilateral lower rib fractures with subtle lytic lesions better seen   on recent chest CT. 2. Mild compression deformity along the inferior endplate of L3 may be acute   as there is increased uptake on concurrent bone scan.    3. No definite lytic lesions in the abdomen/pelvis although osteopenia limits evaluation. 4. Extensive sigmoid diverticulosis without acute diverticulitis. CT CHEST PULMONARY EMBOLISM W CONTRAST   Final Result   1. Multiple acute bilateral pulmonary emboli with evidence of right   ventricular strain. 2. Disseminated osteolytic disease. The differential includes metastatic   disease and myeloma. Acknowledgement of findings was confirmed by Kristie Marinelli at 10:22 am on   9/21/2022. XR CHEST PORTABLE   Final Result   Clear lungs. Discharge Medications     Medication List        START taking these medications      ALPRAZolam 0.25 MG tablet  Commonly known as: XANAX  Take 1 tablet by mouth 4 times daily as needed for Anxiety or Sleep for up to 3 days. * apixaban 5 MG Tabs tablet  Commonly known as: ELIQUIS  Take 2 tablets by mouth 2 times daily for 14 doses     * apixaban 5 MG Tabs tablet  Commonly known as: ELIQUIS  Take 1 tablet by mouth 2 times daily  Start taking on: October 4, 2022     HYDROcodone-acetaminophen 5-325 MG per tablet  Commonly known as: NORCO  Take 1 tablet by mouth every 6 hours as needed for Pain for up to 3 days. ipratropium-albuterol 0.5-2.5 (3) MG/3ML Soln nebulizer solution  Commonly known as: DUONEB  Inhale 3 mLs into the lungs every 4 hours as needed for Shortness of Breath           * This list has 2 medication(s) that are the same as other medications prescribed for you. Read the directions carefully, and ask your doctor or other care provider to review them with you.                 CONTINUE taking these medications      acetaminophen 325 MG tablet  Commonly known as: TYLENOL     aluminum & magnesium hydroxide-simethicone 400-400-40 MG/5ML Susp  Commonly known as: MYLANTA     atorvastatin 10 MG tablet  Commonly known as: LIPITOR     carvedilol 6.25 MG tablet  Commonly known as: COREG     docusate sodium 100 MG capsule  Commonly known as: COLACE     fenofibrate 160 MG tablet  Commonly known as: TRIGLIDE     ferrous sulfate 325 (65 Fe) MG tablet  Commonly known as: IRON 325     levocetirizine 5 MG tablet  Commonly known as: XYZAL     magnesium hydroxide 400 MG/5ML suspension  Commonly known as: MILK OF MAGNESIA     melatonin 3 MG Tabs tablet     * mycophenolate 500 MG tablet  Commonly known as: CELLCEPT     * mycophenolate 500 MG tablet  Commonly known as: CELLCEPT     omeprazole 20 MG delayed release capsule  Commonly known as: PRILOSEC     Plecanatide 3 MG Tabs     potassium chloride 10 MEQ extended release capsule  Commonly known as: MICRO-K     vitamin D 1.25 MG (12433 UT) Caps capsule  Commonly known as: ERGOCALCIFEROL           * This list has 2 medication(s) that are the same as other medications prescribed for you. Read the directions carefully, and ask your doctor or other care provider to review them with you. STOP taking these medications      furosemide 40 MG tablet  Commonly known as: LASIX     metOLazone 2.5 MG tablet  Commonly known as: ZAROXOLYN     traMADol 50 MG tablet  Commonly known as: ULTRAM               Where to Get Your Medications        You can get these medications from any pharmacy    Bring a paper prescription for each of these medications  ALPRAZolam 0.25 MG tablet  HYDROcodone-acetaminophen 5-325 MG per tablet       Information about where to get these medications is not yet available    Ask your nurse or doctor about these medications  apixaban 5 MG Tabs tablet  apixaban 5 MG Tabs tablet  ipratropium-albuterol 0.5-2.5 (3) MG/3ML Soln nebulizer solution           Discharged in stable condition to SNF. Total time 35 minutes. > 50%  dominated by counseling and coordination of care. Follow Up:   Follow up with MD at [facility]; F/U with heme/ onc        Sanjay Chung MD